# Patient Record
Sex: FEMALE | Race: BLACK OR AFRICAN AMERICAN | NOT HISPANIC OR LATINO | Employment: FULL TIME | URBAN - METROPOLITAN AREA
[De-identification: names, ages, dates, MRNs, and addresses within clinical notes are randomized per-mention and may not be internally consistent; named-entity substitution may affect disease eponyms.]

---

## 2019-08-21 ENCOUNTER — APPOINTMENT (OUTPATIENT)
Dept: RADIOLOGY | Facility: MEDICAL CENTER | Age: 19
End: 2019-08-21
Attending: EMERGENCY MEDICINE
Payer: MEDICAID

## 2019-08-21 ENCOUNTER — HOSPITAL ENCOUNTER (EMERGENCY)
Facility: MEDICAL CENTER | Age: 19
End: 2019-08-21
Attending: EMERGENCY MEDICINE
Payer: MEDICAID

## 2019-08-21 VITALS
HEIGHT: 71 IN | OXYGEN SATURATION: 100 % | SYSTOLIC BLOOD PRESSURE: 110 MMHG | TEMPERATURE: 99.2 F | HEART RATE: 76 BPM | RESPIRATION RATE: 17 BRPM | DIASTOLIC BLOOD PRESSURE: 63 MMHG | WEIGHT: 170.86 LBS | BODY MASS INDEX: 23.92 KG/M2

## 2019-08-21 DIAGNOSIS — R55 SYNCOPE, UNSPECIFIED SYNCOPE TYPE: ICD-10-CM

## 2019-08-21 DIAGNOSIS — D64.9 ANEMIA, UNSPECIFIED TYPE: ICD-10-CM

## 2019-08-21 DIAGNOSIS — Z3A.24 24 WEEKS GESTATION OF PREGNANCY: ICD-10-CM

## 2019-08-21 DIAGNOSIS — E87.6 HYPOKALEMIA: ICD-10-CM

## 2019-08-21 LAB
AMORPH CRY #/AREA URNS HPF: PRESENT /HPF
ANION GAP SERPL CALC-SCNC: 9 MMOL/L (ref 0–11.9)
APPEARANCE UR: ABNORMAL
BACTERIA #/AREA URNS HPF: NEGATIVE /HPF
BASOPHILS # BLD AUTO: 0.3 % (ref 0–1.8)
BASOPHILS # BLD: 0.02 K/UL (ref 0–0.12)
BILIRUB UR QL STRIP.AUTO: NEGATIVE
BUN SERPL-MCNC: <5 MG/DL (ref 8–22)
CALCIUM SERPL-MCNC: 8.9 MG/DL (ref 8.4–10.2)
CHLORIDE SERPL-SCNC: 101 MMOL/L (ref 96–112)
CO2 SERPL-SCNC: 23 MMOL/L (ref 20–33)
COLOR UR: YELLOW
CREAT SERPL-MCNC: 0.4 MG/DL (ref 0.5–1.4)
EKG IMPRESSION: NORMAL
EOSINOPHIL # BLD AUTO: 0.03 K/UL (ref 0–0.51)
EOSINOPHIL NFR BLD: 0.4 % (ref 0–6.9)
EPI CELLS #/AREA URNS HPF: NORMAL /HPF
ERYTHROCYTE [DISTWIDTH] IN BLOOD BY AUTOMATED COUNT: 39 FL (ref 35.9–50)
GLUCOSE SERPL-MCNC: 99 MG/DL (ref 65–99)
GLUCOSE UR STRIP.AUTO-MCNC: NEGATIVE MG/DL
HCT VFR BLD AUTO: 28.3 % (ref 37–47)
HGB BLD-MCNC: 9 G/DL (ref 12–16)
IMM GRANULOCYTES # BLD AUTO: 0.03 K/UL (ref 0–0.11)
IMM GRANULOCYTES NFR BLD AUTO: 0.4 % (ref 0–0.9)
KETONES UR STRIP.AUTO-MCNC: NEGATIVE MG/DL
LEUKOCYTE ESTERASE UR QL STRIP.AUTO: ABNORMAL
LYMPHOCYTES # BLD AUTO: 1.36 K/UL (ref 1–4.8)
LYMPHOCYTES NFR BLD: 17.5 % (ref 22–41)
MAGNESIUM SERPL-MCNC: 1.5 MG/DL (ref 1.5–2.5)
MCH RBC QN AUTO: 26.2 PG (ref 27–33)
MCHC RBC AUTO-ENTMCNC: 31.8 G/DL (ref 33.6–35)
MCV RBC AUTO: 82.3 FL (ref 81.4–97.8)
MICRO URNS: ABNORMAL
MONOCYTES # BLD AUTO: 0.5 K/UL (ref 0–0.85)
MONOCYTES NFR BLD AUTO: 6.4 % (ref 0–13.4)
NEUTROPHILS # BLD AUTO: 5.85 K/UL (ref 2–7.15)
NEUTROPHILS NFR BLD: 75 % (ref 44–72)
NITRITE UR QL STRIP.AUTO: NEGATIVE
NRBC # BLD AUTO: 0 K/UL
NRBC BLD-RTO: 0 /100 WBC
PH UR STRIP.AUTO: 7 [PH] (ref 5–8)
PHOSPHATE SERPL-MCNC: 2.6 MG/DL (ref 2.5–4.5)
PLATELET # BLD AUTO: 201 K/UL (ref 164–446)
PMV BLD AUTO: 9.6 FL (ref 9–12.9)
POTASSIUM SERPL-SCNC: 3.1 MMOL/L (ref 3.6–5.5)
PROT UR QL STRIP: NEGATIVE MG/DL
RBC # BLD AUTO: 3.44 M/UL (ref 4.2–5.4)
RBC # URNS HPF: NORMAL /HPF
RBC UR QL AUTO: ABNORMAL
SODIUM SERPL-SCNC: 133 MMOL/L (ref 135–145)
SP GR UR STRIP.AUTO: 1.02
UNIDENT CRYS URNS QL MICRO: NORMAL /HPF
WBC # BLD AUTO: 7.8 K/UL (ref 4.8–10.8)
WBC #/AREA URNS HPF: NORMAL /HPF

## 2019-08-21 PROCEDURE — 85025 COMPLETE CBC W/AUTO DIFF WBC: CPT

## 2019-08-21 PROCEDURE — 700102 HCHG RX REV CODE 250 W/ 637 OVERRIDE(OP): Performed by: EMERGENCY MEDICINE

## 2019-08-21 PROCEDURE — 83735 ASSAY OF MAGNESIUM: CPT

## 2019-08-21 PROCEDURE — 93005 ELECTROCARDIOGRAM TRACING: CPT | Performed by: EMERGENCY MEDICINE

## 2019-08-21 PROCEDURE — A9270 NON-COVERED ITEM OR SERVICE: HCPCS | Performed by: EMERGENCY MEDICINE

## 2019-08-21 PROCEDURE — 36415 COLL VENOUS BLD VENIPUNCTURE: CPT

## 2019-08-21 PROCEDURE — 80048 BASIC METABOLIC PNL TOTAL CA: CPT

## 2019-08-21 PROCEDURE — 76815 OB US LIMITED FETUS(S): CPT

## 2019-08-21 PROCEDURE — 99285 EMERGENCY DEPT VISIT HI MDM: CPT

## 2019-08-21 PROCEDURE — 84100 ASSAY OF PHOSPHORUS: CPT

## 2019-08-21 PROCEDURE — 81001 URINALYSIS AUTO W/SCOPE: CPT

## 2019-08-21 PROCEDURE — 700105 HCHG RX REV CODE 258: Performed by: EMERGENCY MEDICINE

## 2019-08-21 RX ORDER — POTASSIUM CHLORIDE 20 MEQ/1
20 TABLET, EXTENDED RELEASE ORAL 2 TIMES DAILY
Qty: 60 TAB | Refills: 11 | Status: SHIPPED | OUTPATIENT
Start: 2019-08-21 | End: 2019-08-26

## 2019-08-21 RX ORDER — SODIUM CHLORIDE 9 MG/ML
1000 INJECTION, SOLUTION INTRAVENOUS ONCE
Status: COMPLETED | OUTPATIENT
Start: 2019-08-21 | End: 2019-08-21

## 2019-08-21 RX ORDER — ACETAMINOPHEN 500 MG
500 TABLET ORAL EVERY 6 HOURS PRN
Status: ON HOLD | COMMUNITY
End: 2019-12-04

## 2019-08-21 RX ORDER — POTASSIUM CHLORIDE 20 MEQ/1
20 TABLET, EXTENDED RELEASE ORAL ONCE
Status: COMPLETED | OUTPATIENT
Start: 2019-08-21 | End: 2019-08-21

## 2019-08-21 RX ADMIN — SODIUM CHLORIDE 1000 ML: 9 INJECTION, SOLUTION INTRAVENOUS at 12:37

## 2019-08-21 RX ADMIN — POTASSIUM CHLORIDE 20 MEQ: 1500 TABLET, EXTENDED RELEASE ORAL at 12:38

## 2019-08-21 NOTE — ED NOTES
Med per order, vs as charted. Continues w/o c/o-pill given with crackers. Call light in reach, denies further needs

## 2019-08-21 NOTE — ED NOTES
Medication Reconciliation updated and complete per pt at bedside  Allergies have been verified   No oral ABX within the last 14 days  Pt [facility]

## 2019-08-21 NOTE — ED TRIAGE NOTES
"Chief Complaint   Patient presents with   • Near Syncopal     Pt reports feeling faint at work, ran to restroom and felt she \" went out\", did not fall off toilet. Also reports being \" in a daze in fromt of a customer\".    • Pregnancy     Pt is 24 weeks along. Has not followed with OB yet in this pregnancy.    • Vomiting     \" I vomit after each time I eat, thats normal for me.\"       "

## 2019-08-21 NOTE — DISCHARGE INSTRUCTIONS
Go to [facility] on [facility] for further issues.  There is a doctor who cares for pregnant patients there.    You need an ultrasound of your heart.    Follow-up with the pregnancy center.  Call today for an appointment.    Drink plenty of fluids.  Take prenatal vitamins.  Take potassium twice daily for 5 days and have your labs rechecked.

## 2019-08-21 NOTE — ED NOTES
Patient leaving against medical advice.   in to talk to patient about risks  Patient with good understanding

## 2019-08-21 NOTE — ED NOTES
Pt rounding upon return from u/s. In no apparent distress, vs as charted, await results. Aware of plan, call light in reach, friend at bedside

## 2019-08-21 NOTE — ED NOTES
Assumed care of pt-in u/s prior to being seen by this rn. Saline lock with blood draw by er tech prior to same

## 2019-08-21 NOTE — ED PROVIDER NOTES
"ED Provider Note    CHIEF COMPLAINT  Chief Complaint   Patient presents with   • Near Syncopal     Pt reports feeling faint at work, ran to restroom and felt she \" went out\", did not fall off toilet. Also reports being \" in a daze in fromt of a customer\".    • Pregnancy     Pt is 24 weeks along. Has not followed with OB yet in this pregnancy.    • Vomiting     \" I vomit after each time I eat, thats normal for me.\"       HPI  Amie Epstein is a 19 y.o.  female who is 24 weeks pregnant and presents complaining of syncope.    Patient states since Saturday she has had 2 syncopal events and 3 times daily presyncope symptoms with feeling dizzy.  Patient states when she has been standing for some time or stands up from a seated position, she experiences vision loss, feeling hot, and passes out.  Patient denies chest pain, palpitations, abdominal pain, vaginal bleeding, fever, chills, urinary symptoms other than frequency.    Patient reports having had prenatal care in [place] prior to moving here.  Patient does not have a current OB.  She has been taking prenatal vitamins.  She has a history of iron deficiency anemia.  She denies a history of sickle cell anemia or thalassemia.    Patient denies history of syncope prior to this pregnancy.    Patient reports vomiting 3 times daily with this pregnancy.  She has been taking promethazine without relief.      ALLERGIES  No Known Allergies    CURRENT MEDICATIONS  Prenatal vitamins  Promethazine    PAST MEDICAL HISTORY     Denies     SURGICAL HISTORY  patient denies any surgical history    SOCIAL HISTORY  Social History     Tobacco Use   • Smoking status: Never Smoker   • Smokeless tobacco: Never Used   Substance and Sexual Activity   • Alcohol use: Not Currently   • Drug use: Not Currently   • Sexual activity: Not on file       Family Hx:  No history of HOCM or preexcitation syndromes      REVIEW OF SYSTEMS  See HPI for further details.  All other systems are " "negative except as above in HPI.      PHYSICAL EXAM  VITAL SIGNS: BP (!) 96/58   Pulse 83   Temp 37.3 °C (99.2 °F) (Temporal)   Resp 17   Ht 1.803 m (5' 11\")   Wt 77.5 kg (170 lb 13.7 oz)   SpO2 98%   BMI 23.83 kg/m²     General:  WDWN, nontoxic appearing in NAD; A+Ox3; V/S as above; tachycardic at triage  Skin: warm and dry; good color; no rash  HEENT: NCAT; EOMs intact; PERRL; no scleral icterus   Neck: FROM; soft  Cardiovascular: Regular heart rate and rhythm.  No murmurs, rubs, or gallops; pulses 2+ bilaterally radially   Lungs: Clear to auscultation with good air movement bilaterally.  No wheezes, rhonchi, or rales.   Abdomen: BS present; soft; NT, gravid; no rebound, guarding, or rigidity.  No organomegaly or pulsatile mass  Extremities: ALBA x 4; no e/o trauma; no pedal edema; neg Brad's  Neurologic: CNs III-XII grossly intact; speech clear; distal sensation intact; strength 5/5 UE/LEs  Psychiatric: Appropriate affect, normal mood    LABS  Results for orders placed or performed during the hospital encounter of 08/21/19   CBC WITH DIFFERENTIAL   Result Value Ref Range    WBC 7.8 4.8 - 10.8 K/uL    RBC 3.44 (L) 4.20 - 5.40 M/uL    Hemoglobin 9.0 (L) 12.0 - 16.0 g/dL    Hematocrit 28.3 (L) 37.0 - 47.0 %    MCV 82.3 81.4 - 97.8 fL    MCH 26.2 (L) 27.0 - 33.0 pg    MCHC 31.8 (L) 33.6 - 35.0 g/dL    RDW 39.0 35.9 - 50.0 fL    Platelet Count 201 164 - 446 K/uL    MPV 9.6 9.0 - 12.9 fL    Neutrophils-Polys 75.00 (H) 44.00 - 72.00 %    Lymphocytes 17.50 (L) 22.00 - 41.00 %    Monocytes 6.40 0.00 - 13.40 %    Eosinophils 0.40 0.00 - 6.90 %    Basophils 0.30 0.00 - 1.80 %    Immature Granulocytes 0.40 0.00 - 0.90 %    Nucleated RBC 0.00 /100 WBC    Neutrophils (Absolute) 5.85 2.00 - 7.15 K/uL    Lymphs (Absolute) 1.36 1.00 - 4.80 K/uL    Monos (Absolute) 0.50 0.00 - 0.85 K/uL    Eos (Absolute) 0.03 0.00 - 0.51 K/uL    Baso (Absolute) 0.02 0.00 - 0.12 K/uL    Immature Granulocytes (abs) 0.03 0.00 - 0.11 K/uL    " NRBC (Absolute) 0.00 K/uL   URINALYSIS   Result Value Ref Range    Color Yellow     Character Cloudy (A)     Specific Gravity 1.020 <1.035    Ph 7.0 5.0 - 8.0    Glucose Negative Negative mg/dL    Ketones Negative Negative mg/dL    Protein Negative Negative mg/dL    Bilirubin Negative Negative    Nitrite Negative Negative    Leukocyte Esterase Trace (A) Negative    Occult Blood Trace (A) Negative    Micro Urine Req Microscopic    Basic Metabolic Panel   Result Value Ref Range    Sodium 133 (L) 135 - 145 mmol/L    Potassium 3.1 (L) 3.6 - 5.5 mmol/L    Chloride 101 96 - 112 mmol/L    Co2 23 20 - 33 mmol/L    Glucose 99 65 - 99 mg/dL    Bun <5 (L) 8 - 22 mg/dL    Creatinine 0.40 (L) 0.50 - 1.40 mg/dL    Calcium 8.9 8.4 - 10.2 mg/dL    Anion Gap 9.0 0.0 - 11.9   MAGNESIUM   Result Value Ref Range    Magnesium 1.5 1.5 - 2.5 mg/dL   PHOSPHORUS   Result Value Ref Range    Phosphorus 2.6 2.5 - 4.5 mg/dL   URINE MICROSCOPIC (W/UA)   Result Value Ref Range    WBC 0-2 /hpf    RBC 0-2 /hpf    Bacteria Negative None /hpf    Epithelial Cells Few Few /hpf    Urine Crystals Mod Amorphous /hpf    Amorphous Crystal Present /hpf   ESTIMATED GFR   Result Value Ref Range    GFR If African American >60 >60 mL/min/1.73 m 2    GFR If Non African American >60 >60 mL/min/1.73 m 2   EKG (NOW)   Result Value Ref Range    Report       [facility]    Test Date:  2019  Pt Name:    MAY MENON              Department: [facility]  MRN:        [id number]                      [facility]  Gender:     Female                       Technician: MONICA  :        2000                   Requested By:FARIHA RITTER  Order #:    [id number]                    Reading MD: FARIHA RITTER MD    Measurements  Intervals                                Axis  Rate:       73                           P:          61  WA:         167                          QRS:        63  QRSD:       89                            T:          30  QT:         373  QTc:        411    Interpretive Statements  Sinus rhythm  Baseline wander in lead(s) V1  No previous ECG available for comparison    Electronically Signed On 8- 12:31:53 PDT by FARIHA RITTER MD         IMAGING  US-OB LIMITED TRANSABDOMINAL   Final Result      Single intrauterine pregnancy of an estimated gestational age of Average 24w 2d with an estimated date of delivery of 12/9/2019.      Scratchy          MEDICAL RECORD  I have reviewed patient's medical record and pertinent results are listed below.      COURSE & MEDICAL DECISION MAKING  I have reviewed any medical record information, laboratory studies and radiographic results as noted.    Amie Epstein is a 19 y.o. female who presents complaining of syncope.  Given the occurrence when she goes from sitting to standing, I suspect orthostatic syncope.    Patient's labs demonstrate anemia with a hemoglobin of 9 and a hematocrit of 28.3.  MCV is normal.  Platelets normal.  CHEM panel demonstrates hyponatremia of 133 and hypokalemia of 3.1.  Urinalysis is negative for obvious UTI.  Urine culture submitted for trace leukocyte esterase.    EKG shows no arrhythmia, delta wave, QT prolongation, or PA shortening.    Orthostatics negative.  This makes me more concerned for a cardiac etiology.  I do not suspect PE given no hypoxia or tachycardia and no chest pain.    NS bolus was ordered for possible dehydration related to patient's sxs of vomiting and syncope.  KCl was also ordered.    I advised the patient that I would like to admit her up at [facility] for further work-up and rehydration as well as potassium replacement.  Patient would like to discuss with her significant other.    12:36 PM  Paging GYN    Dr. Rivera was on-call at [facility].  She called back but I was in a code stroke.    Upon reevaluation, the patient declines admission.  I explained that I would like to do a cardiac work-up  including echocardiogram and monitor her overnight for arrhythmia as well as replete her potassium and get her nausea under control so she does not become dehydrated.  Patient would like to sign out AMA.  She does not wish to be admitted and/or transferred.  She will go to [facility] where there is a GYN on call if she desires to be reassessed.  I have prescribed potassium for her.  She is aware of her anemia and that this may also be contributing to her symptoms.  She will need close follow-up with the [facility] pregnancy center, including an echocardiogram, in my opinion.  She is alert and oriented and appears competent to understand the risks involved and signing out AGAINST MEDICAL ADVICE including death of her and/or her baby.      FINAL IMPRESSION  1. Syncope, unspecified syncope type     2. Anemia, unspecified type     3. Hypokalemia     4. 24 weeks gestation of pregnancy         Electronically signed by: Mel Soriano, 8/21/2019 10:54 AM

## 2019-09-09 ENCOUNTER — INITIAL PRENATAL (OUTPATIENT)
Dept: OBGYN | Facility: CLINIC | Age: 19
End: 2019-09-09
Payer: MEDICAID

## 2019-09-09 VITALS
SYSTOLIC BLOOD PRESSURE: 100 MMHG | BODY MASS INDEX: 24.22 KG/M2 | HEIGHT: 71 IN | DIASTOLIC BLOOD PRESSURE: 60 MMHG | WEIGHT: 173 LBS

## 2019-09-09 DIAGNOSIS — Z34.03 SUPERVISION OF NORMAL FIRST PREGNANCY IN THIRD TRIMESTER: Primary | ICD-10-CM

## 2019-09-09 DIAGNOSIS — O99.013 ANEMIA IN PREGNANCY, THIRD TRIMESTER: ICD-10-CM

## 2019-09-09 LAB
APPEARANCE UR: CLEAR
BILIRUB UR STRIP-MCNC: NORMAL MG/DL
COLOR UR AUTO: YELLOW
GLUCOSE UR STRIP.AUTO-MCNC: NEGATIVE MG/DL
KETONES UR STRIP.AUTO-MCNC: NORMAL MG/DL
LEUKOCYTE ESTERASE UR QL STRIP.AUTO: NORMAL
NITRITE UR QL STRIP.AUTO: NEGATIVE
PH UR STRIP.AUTO: 7.5 [PH] (ref 5–8)
PROT UR QL STRIP: NORMAL MG/DL
RBC UR QL AUTO: NORMAL
SP GR UR STRIP.AUTO: 1.02
UROBILINOGEN UR STRIP-MCNC: NORMAL MG/DL

## 2019-09-09 PROCEDURE — 0500F INITIAL PRENATAL CARE VISIT: CPT | Performed by: NURSE PRACTITIONER

## 2019-09-09 PROCEDURE — 81002 URINALYSIS NONAUTO W/O SCOPE: CPT | Performed by: NURSE PRACTITIONER

## 2019-09-09 NOTE — PROGRESS NOTES
"Subjective:   Amie Epstein is a 19 y.o. AA  @ EGA: 27w0d BREE: Estimated Date of Delivery: 19  per US who presents for her new OB exam.  No c/o today.  Too late for AFP.  Declines CF.  Reports good FM.  Denies VB, LOF, or cramping.  Denies dysuria, vaginal DC.  Pregnancy is unplanned but wanted.  Transferring care from [place] - no records here as yet.     Initial Prenatal Visit          HPI  Review of Systems   All other systems reviewed and are negative.         Objective:     /60   Ht 1.803 m (5' 11\")   Wt 78.5 kg (173 lb)   LMP 2019   BMI 24.13 kg/m²          Wet mount: deferred        FHTs: 155        Fundal ht: 26     Physical Exam   Constitutional: She is oriented to person, place, and time. She appears well-developed and well-nourished.   HENT:   Head: Normocephalic and atraumatic.   Eyes:   Eye and ear exam deferred   Neck: Normal range of motion. Neck supple. No thyromegaly present.   Cardiovascular: Normal rate, regular rhythm, normal heart sounds and intact distal pulses.   Pulmonary/Chest: Effort normal and breath sounds normal.   Deferred   Abdominal: Soft. Normal appearance and bowel sounds are normal. There is no CVA tenderness.   Gravid   Genitourinary: No breast swelling, tenderness, discharge or bleeding.   Genitourinary Comments: Deferred   Musculoskeletal: Normal range of motion.   Neurological: She is alert and oriented to person, place, and time. She has normal reflexes.   Skin: Skin is warm and dry. Capillary refill takes less than 2 seconds.   Psychiatric: She has a normal mood and affect. Her behavior is normal. Judgment and thought content normal.   Nursing note and vitals reviewed.          A:   1.  IUP @ 27w0d BREE: Estimated Date of Delivery: 19 per US         2.  S=D        3.    Patient Active Problem List    Diagnosis Date Noted   • Supervision of normal first pregnancy in third trimester 2019     P:  1.  GC/CT deferred. Pap deferred. "         2.  28wk labs ordered - lab slip given        3.  Discussed PNV, diet, and adequate water intake        4.  NOB packet given        5.  Return to office in 2 wks        6.  Complete OB US already done.        7.  First trimester screening - too late.        8.  Instructions given on FKCs.         9.  Tdap declined.      10.  Records requested.

## 2019-09-09 NOTE — LETTER
Cystic Fibrosis Carrier Testing  Amei Epstein    The following information is about a blood test that can be done to determine if you and/or your partner carry the gene for cystic fibrosis.    WHAT IS CYSTIC FIBROSIS?  · Cystic fibrosis (CF) is an inherited disease that affects more than 25,000 American children and young adults.  · Symptoms of CF vary but include lung congestion, pneumonia, diarrhea and poor growth.  Most people with CF have severe medical problems and some die at a young age.  Others have so few symptoms they are unaware they have CF.  · CF does not affect intelligence.  · Although there is no cure for CF at this time, scientists are making progress in improving treatment and in searching for a cure.  In the past many people with CF  at a very young age.  Today, many are living into their 20’s and 30’s.    IS THERE A CHANCE MY BABY COULD HAVE CYSTIC FIBROSIS?  · You can have a child with CF even if there is no history in your family (see chart below).  · CF testing can help determine if you are a carrier and at risk to have a child with CF.  Note: if both parents are carriers, there is a 1 in 4 (25%) chance with each pregnancy that they will have a child with CF.  · Carriers have one normal CF gene and one altered CF gene.  · People with CF have two altered CF genes.  · Most people have two normal copies of the CF gene.    Approximate risk that a couple with no family history of cystic fibrosis will have a child with cystic fibrosis:    Ethnic background / Risk     couple:  1 in 2,500   couple:  1 in 15,000            couple:  1 in 8,000     American couple:  1 in 32,000     WHAT TESTING IS AVAILABLE?  · There is a blood test that can be done to find out if you or your partner is a carrier.  · It is important to understand that CF carrier testing does not detect all CF carriers.  · If the test shows that you are both CF carriers, you unborn baby  can be tested to find out if the baby has CF.    HOW MUCH DOES IT COST TO HAVE CYSTIC FIBROSIS CARRIER TESTING?  · Cost and insurance coverage for CF carrier testing vary depending upon the laboratory used and your insurance policy.  · The average cost for CF carrier testing is $300 per person.  · Your genetic counselor can provide you with more information about cystic fibrosis carrier testing.    _____  Yes, I am interested in discussing carrier testing with a genetic counselor.    _____  No, I am not interested in CF carrier testing or in receiving more information about CF carrier testing.      Client signature: ________________________________________  9/9/2019

## 2019-09-09 NOTE — PROGRESS NOTES
Pt. Here for NOB visit.  Good FM, kick count sheet given today along with instructions.   # [phone]  Pt is a transfer of care from [place].   Pt went to [facility] on 8/21/19 for syncope.   Pt. States no complaints.   Pharmacy verified.   Chaperone offered and provided.   Tdap unsure.

## 2019-09-09 NOTE — LETTER
"Count Your Baby's Movements  Another step to a healthy delivery    Martinmadeline MatthewsEpstein             Dept: [phone]    How Many Weeks Pregnant 27w0d    Date to Begin Countin19              How to use this chart    One way for your physician to keep track of your baby's health is by knowing how often the baby moves (or \"kicks\") in your womb.  You can help your physician to do this by using this chart every day.    Every day, you should see how many hours it takes for your baby to move 10 times.  Start in the morning, as soon as you get up.    · First, write down the time your baby moves until you get to 10.  · Check off one box every time your baby moves until you get to 10.  · Write down the time you finished counting in the last column.  · Total how long it took to count up all 10 movements.  · Finally, fill in the box that shows how long this took.  After counting 10 movements, you no longer have to count any more that day.  The next morning, just start counting again as soon as you get up.    What should you call a \"movement\"?  It is hard to say, because it will feel different from one mother to another and from one pregnancy to the next.  The important thing is that you count the movements the same way throughout your pregnancy.  If you have more questions, you should ask your physician.    Count carefully every day!  SAMPLE:  Week 28    How many hours did it take to feel 10 movements?       Start  Time     1     2     3     4     5     6     7     8     9     10   Finish Time   Mon 8:20 ·  ·  ·  ·  ·  ·  ·  ·  ·  ·  11:40                  Sat               Sun                 IMPORTANT: You should contact your physician if it takes more than two hours for you to feel 10 movements.  Each morning, write down the time and start to count the movements of your baby.  Keep track by checking off one box every time you feel one movement.  When you have felt " "10 \"kicks\", write down the time you finished counting in the last column.  Then fill in the   box (over the check katherine) for the number of hours it took.  Be sure to read the complete instructions on the previous page.            "

## 2019-09-09 NOTE — PATIENT INSTRUCTIONS
P:  1.  GC/CT deferred. Pap deferred.         2.  28wk labs ordered - lab slip given        3.  Discussed PNV, diet, and adequate water intake        4.  NOB packet given        5.  Return to office in 2 wks        6.  Complete OB US already done.        7.  First trimester screening - too late.        8.  Instructions given on FKCs.         9.  Tdap declined.      10.  Records requested.

## 2019-09-20 ENCOUNTER — HOSPITAL ENCOUNTER (OUTPATIENT)
Dept: LAB | Facility: MEDICAL CENTER | Age: 19
End: 2019-09-20
Attending: NURSE PRACTITIONER
Payer: MEDICAID

## 2019-09-20 ENCOUNTER — ROUTINE PRENATAL (OUTPATIENT)
Dept: OBGYN | Facility: CLINIC | Age: 19
End: 2019-09-20
Payer: MEDICAID

## 2019-09-20 VITALS — BODY MASS INDEX: 24.27 KG/M2 | WEIGHT: 174 LBS | SYSTOLIC BLOOD PRESSURE: 102 MMHG | DIASTOLIC BLOOD PRESSURE: 68 MMHG

## 2019-09-20 DIAGNOSIS — Z34.03 SUPERVISION OF NORMAL FIRST PREGNANCY IN THIRD TRIMESTER: ICD-10-CM

## 2019-09-20 DIAGNOSIS — Z34.03 ENCOUNTER FOR SUPERVISION OF NORMAL FIRST PREGNANCY IN THIRD TRIMESTER: ICD-10-CM

## 2019-09-20 PROCEDURE — 90040 PR PRENATAL FOLLOW UP: CPT | Performed by: NURSE PRACTITIONER

## 2019-09-20 NOTE — PROGRESS NOTES
OB follow up   + fetal movement.  No VB, LOF or UC's.  Wt: 174      BP:102/68  Phone # [phone]  Preferred pharmacy confirmed.  Kick count sheet given today.  Tdap offered  Flu vaccine offered  Labs? Not done yet, records not received.  Provider instructed 1HR Glucose, PNP given to patient with instructions.

## 2019-09-20 NOTE — PROGRESS NOTES
S:  Pt is  at 28w4d for routine OB follow up.  No concerns today. No prenatal or US on file. Reports good FM.  Denies VB, LOF, RUCs or vaginal DC.    O:  Please see above vitals.        FHTs: 145        Fundal ht: 27 cm.        S=D            A:  IUP at 28w4d  Patient Active Problem List    Diagnosis Date Noted   • Supervision of normal first pregnancy in third trimester 2019   • Anemia in pregnancy, third trimester 2019        P:  1.  Declines BTL.          2.  Instructions given on FKCs.          3.  Questions answered.          4.  PNL, 1 hr GTT, OB US ordered.          5.  Encourage adequate water intake.        6.   labor precautions reviewed.         7.  F/u 2 wks.        8.  TDap declined.        9.  Flu vaccine declined.

## 2019-09-20 NOTE — LETTER
"Count Your Baby's Movements  Another step to a healthy delivery    Martinmadeline MatthewsEpstein             Dept: [phone]    How Many Weeks Pregnant 28w4d  Date to Begin Countin19           How to use this chart    One way for your physician to keep track of your baby's health is by knowing how often the baby moves (or \"kicks\") in your womb.  You can help your physician to do this by using this chart every day.    Every day, you should see how many hours it takes for your baby to move 10 times.  Start in the morning, as soon as you get up.    · First, write down the time your baby moves until you get to 10.  · Check off one box every time your baby moves until you get to 10.  · Write down the time you finished counting in the last column.  · Total how long it took to count up all 10 movements.  · Finally, fill in the box that shows how long this took.  After counting 10 movements, you no longer have to count any more that day.  The next morning, just start counting again as soon as you get up.    What should you call a \"movement\"?  It is hard to say, because it will feel different from one mother to another and from one pregnancy to the next.  The important thing is that you count the movements the same way throughout your pregnancy.  If you have more questions, you should ask your physician.    Count carefully every day!  SAMPLE:  Week 28    How many hours did it take to feel 10 movements?       Start  Time     1     2     3     4     5     6     7     8     9     10   Finish Time   Mon 8:20 ·  ·  ·  ·  ·  ·  ·  ·  ·  ·  11:40                  [facility]                 IMPORTANT: You should contact your physician if it takes more than two hours for you to feel 10 movements.  Each morning, write down the time and start to count the movements of your baby.  Keep track by checking off one box every time you feel one movement.  When you have felt 10 " "\"kicks\", write down the time you finished counting in the last column.  Then fill in the   box (over the check katherine) for the number of hours it took.  Be sure to read the complete instructions on the previous page.            "

## 2019-09-27 PROBLEM — O09.899 RUBELLA NON-IMMUNE STATUS, ANTEPARTUM: Status: ACTIVE | Noted: 2019-09-27

## 2019-09-27 PROBLEM — Z28.39 RUBELLA NON-IMMUNE STATUS, ANTEPARTUM: Status: ACTIVE | Noted: 2019-09-27

## 2019-10-04 ENCOUNTER — APPOINTMENT (OUTPATIENT)
Dept: RADIOLOGY | Facility: IMAGING CENTER | Age: 19
End: 2019-10-04
Attending: NURSE PRACTITIONER
Payer: MEDICAID

## 2019-10-04 ENCOUNTER — ROUTINE PRENATAL (OUTPATIENT)
Dept: OBGYN | Facility: CLINIC | Age: 19
End: 2019-10-04
Payer: MEDICAID

## 2019-10-04 VITALS — WEIGHT: 180 LBS | DIASTOLIC BLOOD PRESSURE: 62 MMHG | BODY MASS INDEX: 25.1 KG/M2 | SYSTOLIC BLOOD PRESSURE: 118 MMHG

## 2019-10-04 DIAGNOSIS — Z34.03 SUPERVISION OF NORMAL FIRST PREGNANCY IN THIRD TRIMESTER: ICD-10-CM

## 2019-10-04 DIAGNOSIS — Z34.03 ENCOUNTER FOR SUPERVISION OF NORMAL FIRST PREGNANCY IN THIRD TRIMESTER: ICD-10-CM

## 2019-10-04 PROCEDURE — 76805 OB US >/= 14 WKS SNGL FETUS: CPT | Performed by: OBSTETRICS & GYNECOLOGY

## 2019-10-04 PROCEDURE — 90040 PR PRENATAL FOLLOW UP: CPT | Performed by: PHYSICIAN ASSISTANT

## 2019-10-04 NOTE — PROGRESS NOTES
Pt has no complaints with cramping, UCs, Vb, LOF. +FM. US done today - results pending. PNL, 1hr GTT wnl but pt has anemia and is rubella non-immune. Pt aware of anemia and has started taking PNV gummies and iron tabs daily. Pt encouraged to eat iron rich foods in diet - info sheet given today. Declines TDaP and flu vaccines, but pt aware she needs PP rubella vaccination. Also, I forgot to get GC/CT on pt today, so will do next visit. RTC 2 wk or sooner prn.

## 2019-10-04 NOTE — PROGRESS NOTES
Pt here today for OB follow up  Pt states complaints   Reports +FM  Good # [phone]  Pharmacy Confirmed.  Did Labs 9/20/19 at [facility].   Declined tdap and flu vaccines.

## 2019-10-08 ENCOUNTER — DATING (OUTPATIENT)
Dept: OBGYN | Facility: CLINIC | Age: 19
End: 2019-10-08

## 2019-10-08 DIAGNOSIS — Z34.03 SUPERVISION OF NORMAL FIRST PREGNANCY IN THIRD TRIMESTER: ICD-10-CM

## 2019-10-18 ENCOUNTER — TELEPHONE (OUTPATIENT)
Dept: OBGYN | Facility: CLINIC | Age: 19
End: 2019-10-18

## 2019-10-18 ENCOUNTER — ROUTINE PRENATAL (OUTPATIENT)
Dept: OBGYN | Facility: CLINIC | Age: 19
End: 2019-10-18
Payer: MEDICAID

## 2019-10-18 VITALS — DIASTOLIC BLOOD PRESSURE: 62 MMHG | WEIGHT: 180 LBS | SYSTOLIC BLOOD PRESSURE: 100 MMHG | BODY MASS INDEX: 25.1 KG/M2

## 2019-10-18 DIAGNOSIS — Z34.03 SUPERVISION OF NORMAL FIRST PREGNANCY IN THIRD TRIMESTER: Primary | ICD-10-CM

## 2019-10-18 PROCEDURE — 90040 PR PRENATAL FOLLOW UP: CPT | Performed by: NURSE PRACTITIONER

## 2019-10-18 NOTE — PROGRESS NOTES
S:  Pt is  at 32w4d for routine OB follow up.  Reports some BH UCs.  Reports good FM.  Denies VB, LOF, RUCs or vaginal DC.    O:  Please see above vitals.        FHTs: 160        Fundal ht: 32 cm.    Complete OB US  10/4/2019 8:07 AM    HISTORY/REASON FOR EXAM:  Evaluate fetal anatomy.    TECHNIQUE/EXAM DESCRIPTION: OB complete ultrasound.    COMPARISON:  Obstetrical ultrasound 2019.    FINDINGS:  Fetal Lie:  Vertex  LMP:  2019  Clinical BREE by LMP:  2019    Placenta (Location):  Posterior  Placenta Previa: No  Placental stGstrstastdstest:st st1st Amniotic Fluid Volume:  CARIDAD = 16.66 cm    Fetal Heart Rate:  166 bpm    Cervical Length:  4.10 cm transabdominal    No maternal adnexal mass is identified.    Umbilical Artery S/D Ratio(s):  Not applicable    Fetal Anatomy  (Seen or Not Seen)  Lateral Ventricles     Seen  Cisterna Magna        Seen  Cerebellum              Seen  CSP             Seen  Orbits             Seen  Face/Lips                Seen  Cord Insertion         Seen  Placental CI         Seen  4 Chamber Heart     Seen  LVOT               Seen  RVOT              Seen  3 Vessel View     Seen  Stomach       Seen  Kidneys                   Seen  Urinary Bladder      Seen  Spine                       Seen  3 Vessel Cord          Seen  Both Upper Extremities    Seen  Both Lower Extremities    Seen  Diaphragm             Seen  Movement       Seen  Gender:  Likely female    Fetal Biometry  BPD    7.32 cm 29 weeks 3 days  HC    28.12 cm 30 weeks 6 days  AC    25.01 cm 29 weeks 2 days  Femur Length    6.03 cm 31 weeks 3 days  Humerus Length    5.56 cm 32 weeks 2 days  Cerebellum Diameter   4.10 cm    EGA by this US:  31 weeks 0 days  BREE by this US: 2019  BREE by 1st US:  2019 by ER    Estimated Fetal Weight:  1509 grams  EFW Percentile: 23.5%    Comments:  Exam is somewhat limited by gestational age and fetal position.      Impression       1.  Single intrauterine pregnancy of an estimated  gestational age of 31 weeks 0 days with an estimated date of delivery of 12/06/2019.    2.  Aneurysmal foramen ovale flap.           A:  IUP at 32w4d  Patient Active Problem List    Diagnosis Date Noted   • Rubella non-immune status, antepartum 09/27/2019   • Supervision of normal first pregnancy in third trimester 09/09/2019   • Anemia in pregnancy, third trimester 09/09/2019        P:  1.  GBS @ 36 wks.          2.  Continue FKCs.          3.  Questions answered.          4.  Encouraged pt to tour L&D.          5.  Encourage adequate water intake.        6.  F/u 2 wks.        7.  US results reviewed w pt.        8.  GCCT done today.        9.  PTL precautions reviewed .    Chaperone offered and provided by Kati Gregory MA.

## 2019-10-18 NOTE — PATIENT INSTRUCTIONS
P:  1.  GBS @ 36 wks.          2.  Continue FKCs.          3.  Questions answered.          4.  Encouraged pt to tour L&D.          5.  Encourage adequate water intake.        6.  F/u 2 wks.        7.  US results reviewed w pt.        8.  GCCT done today.        9.  PTL precautions reviewed .

## 2019-10-18 NOTE — TELEPHONE ENCOUNTER
Pt called stating she had GC/CT test done this morning  and noticed she has some spotting, denies cramps. Adv pt normal to spot after test and Labor precautions given.  Pt understood and had no questions or concerns

## 2019-10-18 NOTE — PROGRESS NOTES
Pt here today for OB follow up  Pt states no complaints  Reports +SELVIN Michael # [phone]  Pharmacy Confirmed.  Chaperone offered and provided.   Pt to have MAICOL today

## 2019-10-22 DIAGNOSIS — Z34.03 SUPERVISION OF NORMAL FIRST PREGNANCY IN THIRD TRIMESTER: ICD-10-CM

## 2019-11-01 ENCOUNTER — ROUTINE PRENATAL (OUTPATIENT)
Dept: OBGYN | Facility: CLINIC | Age: 19
End: 2019-11-01
Payer: MEDICAID

## 2019-11-01 VITALS — BODY MASS INDEX: 24.83 KG/M2 | WEIGHT: 178 LBS | DIASTOLIC BLOOD PRESSURE: 60 MMHG | SYSTOLIC BLOOD PRESSURE: 100 MMHG

## 2019-11-01 DIAGNOSIS — Z34.03 SUPERVISION OF NORMAL FIRST PREGNANCY IN THIRD TRIMESTER: Primary | ICD-10-CM

## 2019-11-01 PROCEDURE — 90040 PR PRENATAL FOLLOW UP: CPT | Performed by: NURSE PRACTITIONER

## 2019-11-01 NOTE — PROGRESS NOTES
S) Pt is a 19 y.o.   at 34w4d  gestation. Routine prenatal care today. Pt having mild back pain, otherwise feeling well.    Fetal movement Normal  Cramping no  VB no  LOF no   Denies dysuria. Generally feels well today. Good self-care activities identified. Denies headaches, swelling, visual changes, or epigastric pain .     O) There were no vitals taken for this visit.        Labs:       PNL: anemic and rubella NI       GCT:87       AFP: Not Examined       GBS: N/A       Pertinent ultrasound - 10/4/19 CARIDAD 16.66, survey WNL, c/w prev dating           A) IUP at 34w4d       S=D         Patient Active Problem List    Diagnosis Date Noted   • Rubella non-immune status, antepartum 2019   • Supervision of normal first pregnancy in third trimester 2019   • Anemia in pregnancy, third trimester 2019          SVE: deferred         TDAP: no       FLU: no        BTL: no       : no       C/S Consent: no       IOL or C/S scheduled: no       LAST PAP: def d/t age         P) s/s ptl vs general discomforts. Fetal movements reviewed. General ed and anticipatory guidance. Nutrition/exercise/vitamin. Plans breast Plans pp contraception- unsure  Continue PNV.   Discussed tylenol, heat, ice, abd support band for back pain  Discussed GBS next visit  RTC 2 weeks or PRN.

## 2019-11-01 NOTE — PROGRESS NOTES
OB follow up   + fetal movement.  No VB, LOF or UC's.  Wt: 178      BP:100/60  Phone #  [phone]  Preferred pharmacy confirmed.  Pt states that her back has been hurting, and some pain in the lower abdomen.

## 2019-11-15 ENCOUNTER — ROUTINE PRENATAL (OUTPATIENT)
Dept: OBGYN | Facility: CLINIC | Age: 19
End: 2019-11-15
Payer: MEDICAID

## 2019-11-15 VITALS — DIASTOLIC BLOOD PRESSURE: 68 MMHG | SYSTOLIC BLOOD PRESSURE: 120 MMHG | WEIGHT: 182 LBS | BODY MASS INDEX: 25.38 KG/M2

## 2019-11-15 DIAGNOSIS — A74.9 CHLAMYDIA INFECTION AFFECTING PREGNANCY, ANTEPARTUM: ICD-10-CM

## 2019-11-15 DIAGNOSIS — Z3A.36 36 WEEKS GESTATION OF PREGNANCY: ICD-10-CM

## 2019-11-15 DIAGNOSIS — O98.819 CHLAMYDIA INFECTION AFFECTING PREGNANCY, ANTEPARTUM: ICD-10-CM

## 2019-11-15 PROCEDURE — 90040 PR PRENATAL FOLLOW UP: CPT | Performed by: NURSE PRACTITIONER

## 2019-11-15 ASSESSMENT — EDINBURGH POSTNATAL DEPRESSION SCALE (EPDS)
I HAVE BLAMED MYSELF UNNECESSARILY WHEN THINGS WENT WRONG: NO, NEVER
I HAVE FELT SAD OR MISERABLE: NO, NOT AT ALL
I HAVE FELT SCARED OR PANICKY FOR NO GOOD REASON: NO, NOT AT ALL
I HAVE LOOKED FORWARD WITH ENJOYMENT TO THINGS: AS MUCH AS I EVER DID
THE THOUGHT OF HARMING MYSELF HAS OCCURRED TO ME: NEVER
THINGS HAVE BEEN GETTING ON TOP OF ME: NO, MOST OF THE TIME I HAVE COPED QUITE WELL
I HAVE BEEN ANXIOUS OR WORRIED FOR NO GOOD REASON: YES, SOMETIMES
I HAVE BEEN SO UNHAPPY THAT I HAVE HAD DIFFICULTY SLEEPING: NOT AT ALL
TOTAL SCORE: 3
I HAVE BEEN ABLE TO LAUGH AND SEE THE FUNNY SIDE OF THINGS: AS MUCH AS I ALWAYS COULD
I HAVE BEEN SO UNHAPPY THAT I HAVE BEEN CRYING: NO, NEVER

## 2019-11-15 NOTE — PROGRESS NOTES
Pt. here for Ob f/u and GBS today. Good # [phone]  Good FM  Pt states no complaints.   Pharmacy verified.   Chaperone offered and not needed.

## 2019-11-15 NOTE — PROGRESS NOTES
SUBJECTIVE:  Pt is a 19 y.o.   at 36w4d  gestation. Presents today for follow-up prenatal care. Reports no issues at this time.  Reports good  fetal movement. Denies cramping/contractions, bleeding or leaking of fluid. Denies dysuria, headaches, N/V. Generally feels well today expect leg siezes up. Reports does not think she can breastfeed because her breasts are small. Taking iron once a day.     OBJECTIVE:  - See prenatal vitals flow  -   Vitals:    11/15/19 1011   BP: 120/68   Weight: 82.6 kg (182 lb)                 ASSESSMENT:   - IUP at 36w4d    - S=D  Patient Active Problem List    Diagnosis Date Noted   • Rubella non-immune status, antepartum 2019   • Supervision of normal first pregnancy in third trimester 2019   • Anemia in pregnancy, third trimester 2019         PLAN:  - S/sx pregnancy and labor warning signs vs general discomforts discussed  - Fetal movements and/or kick counts reviewed   - Adequate hydration reinforced  - Nutrition/exercise/vitamin education; continue PNV  - Plans for formulafeeding; reviewed benefits of BF  - Plans unsure for contraception Pp: handout given and reviewed  - Reviewed active labor s/sx and pt had questions about when she can get epidural   - GBS collected along with EPDS screening   - Anticipatory guidance given  - RTC in 1 weeks for follow-up prenatal care

## 2019-11-19 DIAGNOSIS — Z3A.36 36 WEEKS GESTATION OF PREGNANCY: ICD-10-CM

## 2019-11-19 PROBLEM — B95.1 GROUP BETA STREP POSITIVE: Status: ACTIVE | Noted: 2019-11-19

## 2019-11-22 ENCOUNTER — ROUTINE PRENATAL (OUTPATIENT)
Dept: OBGYN | Facility: CLINIC | Age: 19
End: 2019-11-22
Payer: MEDICAID

## 2019-11-22 VITALS — SYSTOLIC BLOOD PRESSURE: 108 MMHG | BODY MASS INDEX: 25.52 KG/M2 | DIASTOLIC BLOOD PRESSURE: 58 MMHG | WEIGHT: 183 LBS

## 2019-11-22 PROCEDURE — 90040 PR PRENATAL FOLLOW UP: CPT | Performed by: NURSE PRACTITIONER

## 2019-11-22 NOTE — PROGRESS NOTES
Pt here today for OB follow up  Pt states no complaints  Reports +FM  Good # [phone]  Pharmacy Confirmed.  Chaperone offered and provided.   GBS +

## 2019-11-22 NOTE — PROGRESS NOTES
SUBJECTIVE:  Pt is a 19 y.o.   at 37w4d  gestation. Presents today for follow-up prenatal care. Reports no issues at this time.  Reports feeling good  fetal movement. Denies cramping/contractions, bleeding or leaking of fluid. Denies dysuria, headaches, N/V. Generally feels well today. Recent ER or L&D visits - none.     OBJECTIVE:  - See prenatal vitals flow  -   Vitals:    19 0905   BP: 108/58   Weight: 83 kg (183 lb)      Fundal Height - 36  FHT - 135  US normal  Prenatal labs GBS positive              ASSESSMENT:   - IUP at 37w4d    - S=D   -   Patient Active Problem List    Diagnosis Date Noted   • Foramen ovale anyeurism fetus 2019   • Group beta Strep positive 2019   • Rubella non-immune status, antepartum 2019   • Supervision of normal first pregnancy in third trimester 2019   • Anemia in pregnancy, third trimester 2019         PLAN:  - S/sx pregnancy and labor warning signs vs general discomforts discussed  - Fetal movements and kick counts reviewed   - Adequate hydration reinforced  - Nutrition/exercise/vitamin education; continued PNV  - Encouraged tour of LnD/childbirth education classes  - We have discussed contraception options.

## 2019-11-27 ENCOUNTER — ROUTINE PRENATAL (OUTPATIENT)
Dept: OBGYN | Facility: CLINIC | Age: 19
End: 2019-11-27
Payer: MEDICAID

## 2019-11-27 VITALS — DIASTOLIC BLOOD PRESSURE: 68 MMHG | WEIGHT: 182 LBS | BODY MASS INDEX: 25.38 KG/M2 | SYSTOLIC BLOOD PRESSURE: 126 MMHG

## 2019-11-27 DIAGNOSIS — Z34.03 SUPERVISION OF NORMAL FIRST PREGNANCY IN THIRD TRIMESTER: Primary | ICD-10-CM

## 2019-11-27 PROCEDURE — 90040 PR PRENATAL FOLLOW UP: CPT | Performed by: NURSE PRACTITIONER

## 2019-11-27 NOTE — PROGRESS NOTES
S) Pt is a 19 y.o.   at 38w2d  gestation. Routine prenatal care today. No complaints today outside of normal pregnancy discomforts. SVE and IOL today. Discussed GBS positive results and what that means for her and baby. Labor precautions reviewed, all questions answered.    Fetal movement Normal  Cramping no  VB no  LOF no   Denies dysuria. Generally feels well today. Good self-care activities identified. Denies headaches, swelling, visual changes, or epigastric pain .     O) /68   Wt 82.6 kg (182 lb)         Labs:       PNL: WNL, Rubella non-immune       GCT: 87        AFP: Not Examined       GBS: positive       Pertinent ultrasound -        10/4/19- Survey with AFO noted, remainder WNL, CARIDAD 16.66cm, c/w prev dating.    A) IUP at 38w2d       S=D         Patient Active Problem List    Diagnosis Date Noted   • Foramen ovale anyeurism fetus 2019   • Group beta Strep positive 2019   • Rubella non-immune status, antepartum 2019   • Supervision of normal first pregnancy in third trimester 2019   • Anemia in pregnancy, third trimester 2019          SVE: closed/30/-2       Chaperone offered: yes         TDAP: no       FLU: no        BTL: no       : n/a       C/S Consent: n/a       IOL or C/S scheduled: no- referral placed today       LAST PAP: deferred due to age         P) s/s ptl vs general discomforts. Fetal movements reviewed. General ed and anticipatory guidance. Nutrition/exercise/vitamin. Plans breast Plans pp contraception- unsure  Continue PNV.

## 2019-11-27 NOTE — PROGRESS NOTES
Pt here today for OB follow up  Pt states no complaints   Reports +  Good # [phone]  Pharmacy Confirmed.  Chaperone offered and declined.

## 2019-12-04 ENCOUNTER — ANESTHESIA (OUTPATIENT)
Dept: ANESTHESIOLOGY | Facility: MEDICAL CENTER | Age: 19
End: 2019-12-04
Payer: MEDICAID

## 2019-12-04 ENCOUNTER — HOSPITAL ENCOUNTER (INPATIENT)
Facility: MEDICAL CENTER | Age: 19
LOS: 2 days | End: 2019-12-06
Attending: OBSTETRICS & GYNECOLOGY | Admitting: OBSTETRICS & GYNECOLOGY
Payer: MEDICAID

## 2019-12-04 ENCOUNTER — ANESTHESIA EVENT (OUTPATIENT)
Dept: ANESTHESIOLOGY | Facility: MEDICAL CENTER | Age: 19
End: 2019-12-04
Payer: MEDICAID

## 2019-12-04 LAB
BASOPHILS # BLD AUTO: 0.1 % (ref 0–1.8)
BASOPHILS # BLD: 0.01 K/UL (ref 0–0.12)
CRYSTALS AMN MICRO: NORMAL
EOSINOPHIL # BLD AUTO: 0.02 K/UL (ref 0–0.51)
EOSINOPHIL NFR BLD: 0.2 % (ref 0–6.9)
ERYTHROCYTE [DISTWIDTH] IN BLOOD BY AUTOMATED COUNT: 45.1 FL (ref 35.9–50)
ERYTHROCYTE [DISTWIDTH] IN BLOOD BY AUTOMATED COUNT: 46.5 FL (ref 35.9–50)
HCT VFR BLD AUTO: 34.1 % (ref 37–47)
HCT VFR BLD AUTO: 36.1 % (ref 37–47)
HGB BLD-MCNC: 10.7 G/DL (ref 12–16)
HGB BLD-MCNC: 11.6 G/DL (ref 12–16)
HOLDING TUBE BB 8507: NORMAL
IMM GRANULOCYTES # BLD AUTO: 0.03 K/UL (ref 0–0.11)
IMM GRANULOCYTES NFR BLD AUTO: 0.3 % (ref 0–0.9)
LYMPHOCYTES # BLD AUTO: 1.82 K/UL (ref 1–4.8)
LYMPHOCYTES NFR BLD: 18.9 % (ref 22–41)
MCH RBC QN AUTO: 26 PG (ref 27–33)
MCH RBC QN AUTO: 26.3 PG (ref 27–33)
MCHC RBC AUTO-ENTMCNC: 31.4 G/DL (ref 33.6–35)
MCHC RBC AUTO-ENTMCNC: 32.1 G/DL (ref 33.6–35)
MCV RBC AUTO: 81.9 FL (ref 81.4–97.8)
MCV RBC AUTO: 83 FL (ref 81.4–97.8)
MONOCYTES # BLD AUTO: 0.79 K/UL (ref 0–0.85)
MONOCYTES NFR BLD AUTO: 8.2 % (ref 0–13.4)
NEUTROPHILS # BLD AUTO: 6.98 K/UL (ref 2–7.15)
NEUTROPHILS NFR BLD: 72.3 % (ref 44–72)
NRBC # BLD AUTO: 0 K/UL
NRBC BLD-RTO: 0 /100 WBC
PLATELET # BLD AUTO: 219 K/UL (ref 164–446)
PLATELET # BLD AUTO: 236 K/UL (ref 164–446)
PMV BLD AUTO: 10.1 FL (ref 9–12.9)
PMV BLD AUTO: 10.5 FL (ref 9–12.9)
RBC # BLD AUTO: 4.11 M/UL (ref 4.2–5.4)
RBC # BLD AUTO: 4.41 M/UL (ref 4.2–5.4)
WBC # BLD AUTO: 13.5 K/UL (ref 4.8–10.8)
WBC # BLD AUTO: 9.7 K/UL (ref 4.8–10.8)

## 2019-12-04 PROCEDURE — 89060 EXAM SYNOVIAL FLUID CRYSTALS: CPT

## 2019-12-04 PROCEDURE — 10907ZC DRAINAGE OF AMNIOTIC FLUID, THERAPEUTIC FROM PRODUCTS OF CONCEPTION, VIA NATURAL OR ARTIFICIAL OPENING: ICD-10-PCS | Performed by: OBSTETRICS & GYNECOLOGY

## 2019-12-04 PROCEDURE — 85025 COMPLETE CBC W/AUTO DIFF WBC: CPT

## 2019-12-04 PROCEDURE — 700111 HCHG RX REV CODE 636 W/ 250 OVERRIDE (IP)

## 2019-12-04 PROCEDURE — 700111 HCHG RX REV CODE 636 W/ 250 OVERRIDE (IP): Performed by: PHYSICIAN ASSISTANT

## 2019-12-04 PROCEDURE — 85027 COMPLETE CBC AUTOMATED: CPT

## 2019-12-04 PROCEDURE — 59400 OBSTETRICAL CARE: CPT | Performed by: PHYSICIAN ASSISTANT

## 2019-12-04 PROCEDURE — 770002 HCHG ROOM/CARE - OB PRIVATE (112)

## 2019-12-04 PROCEDURE — 36415 COLL VENOUS BLD VENIPUNCTURE: CPT

## 2019-12-04 PROCEDURE — 303615 HCHG EPIDURAL/SPINAL ANESTHESIA FOR LABOR

## 2019-12-04 PROCEDURE — 700111 HCHG RX REV CODE 636 W/ 250 OVERRIDE (IP): Performed by: ANESTHESIOLOGY

## 2019-12-04 PROCEDURE — 304965 HCHG RECOVERY SERVICES

## 2019-12-04 PROCEDURE — 700105 HCHG RX REV CODE 258: Performed by: PHYSICIAN ASSISTANT

## 2019-12-04 PROCEDURE — 59409 OBSTETRICAL CARE: CPT

## 2019-12-04 RX ORDER — MISOPROSTOL 200 UG/1
600 TABLET ORAL
Status: DISCONTINUED | OUTPATIENT
Start: 2019-12-04 | End: 2019-12-06 | Stop reason: HOSPADM

## 2019-12-04 RX ORDER — ACETAMINOPHEN 325 MG/1
325 TABLET ORAL EVERY 4 HOURS PRN
Status: DISCONTINUED | OUTPATIENT
Start: 2019-12-04 | End: 2019-12-06 | Stop reason: HOSPADM

## 2019-12-04 RX ORDER — METHYLERGONOVINE MALEATE 0.2 MG/ML
0.2 INJECTION INTRAVENOUS
Status: DISCONTINUED | OUTPATIENT
Start: 2019-12-04 | End: 2019-12-04 | Stop reason: HOSPADM

## 2019-12-04 RX ORDER — SODIUM CHLORIDE, SODIUM LACTATE, POTASSIUM CHLORIDE, AND CALCIUM CHLORIDE .6; .31; .03; .02 G/100ML; G/100ML; G/100ML; G/100ML
250 INJECTION, SOLUTION INTRAVENOUS PRN
Status: DISCONTINUED | OUTPATIENT
Start: 2019-12-04 | End: 2019-12-04 | Stop reason: HOSPADM

## 2019-12-04 RX ORDER — BUPIVACAINE HYDROCHLORIDE 2.5 MG/ML
INJECTION, SOLUTION EPIDURAL; INFILTRATION; INTRACAUDAL
Status: COMPLETED | OUTPATIENT
Start: 2019-12-04 | End: 2019-12-04

## 2019-12-04 RX ORDER — SODIUM CHLORIDE, SODIUM LACTATE, POTASSIUM CHLORIDE, CALCIUM CHLORIDE 600; 310; 30; 20 MG/100ML; MG/100ML; MG/100ML; MG/100ML
INJECTION, SOLUTION INTRAVENOUS CONTINUOUS
Status: DISPENSED | OUTPATIENT
Start: 2019-12-04 | End: 2019-12-04

## 2019-12-04 RX ORDER — VITAMIN A ACETATE, BETA CAROTENE, ASCORBIC ACID, CHOLECALCIFEROL, .ALPHA.-TOCOPHEROL ACETATE, DL-, THIAMINE MONONITRATE, RIBOFLAVIN, NIACINAMIDE, PYRIDOXINE HYDROCHLORIDE, FOLIC ACID, CYANOCOBALAMIN, CALCIUM CARBONATE, FERROUS FUMARATE, ZINC OXIDE, CUPRIC OXIDE 3080; 12; 120; 400; 1; 1.84; 3; 20; 22; 920; 25; 200; 27; 10; 2 [IU]/1; UG/1; MG/1; [IU]/1; MG/1; MG/1; MG/1; MG/1; MG/1; [IU]/1; MG/1; MG/1; MG/1; MG/1; MG/1
1 TABLET, FILM COATED ORAL EVERY MORNING
Status: DISCONTINUED | OUTPATIENT
Start: 2019-12-05 | End: 2019-12-06 | Stop reason: HOSPADM

## 2019-12-04 RX ORDER — SODIUM CHLORIDE, SODIUM LACTATE, POTASSIUM CHLORIDE, CALCIUM CHLORIDE 600; 310; 30; 20 MG/100ML; MG/100ML; MG/100ML; MG/100ML
INJECTION, SOLUTION INTRAVENOUS PRN
Status: DISCONTINUED | OUTPATIENT
Start: 2019-12-04 | End: 2019-12-06 | Stop reason: HOSPADM

## 2019-12-04 RX ORDER — SODIUM CHLORIDE, SODIUM LACTATE, POTASSIUM CHLORIDE, AND CALCIUM CHLORIDE .6; .31; .03; .02 G/100ML; G/100ML; G/100ML; G/100ML
1000 INJECTION, SOLUTION INTRAVENOUS
Status: DISCONTINUED | OUTPATIENT
Start: 2019-12-04 | End: 2019-12-04 | Stop reason: HOSPADM

## 2019-12-04 RX ORDER — ROPIVACAINE HYDROCHLORIDE 2 MG/ML
INJECTION, SOLUTION EPIDURAL; INFILTRATION; PERINEURAL
Status: COMPLETED
Start: 2019-12-04 | End: 2019-12-04

## 2019-12-04 RX ORDER — MISOPROSTOL 200 UG/1
800 TABLET ORAL
Status: DISCONTINUED | OUTPATIENT
Start: 2019-12-04 | End: 2019-12-04 | Stop reason: HOSPADM

## 2019-12-04 RX ORDER — IBUPROFEN 600 MG/1
600 TABLET ORAL EVERY 6 HOURS PRN
Status: DISCONTINUED | OUTPATIENT
Start: 2019-12-04 | End: 2019-12-06 | Stop reason: HOSPADM

## 2019-12-04 RX ORDER — DOCUSATE SODIUM 100 MG/1
100 CAPSULE, LIQUID FILLED ORAL 2 TIMES DAILY PRN
Status: DISCONTINUED | OUTPATIENT
Start: 2019-12-04 | End: 2019-12-06 | Stop reason: HOSPADM

## 2019-12-04 RX ORDER — OXYCODONE HYDROCHLORIDE AND ACETAMINOPHEN 5; 325 MG/1; MG/1
1 TABLET ORAL EVERY 4 HOURS PRN
Status: DISCONTINUED | OUTPATIENT
Start: 2019-12-04 | End: 2019-12-06 | Stop reason: HOSPADM

## 2019-12-04 RX ORDER — METHYLERGONOVINE MALEATE 0.2 MG/ML
0.2 INJECTION INTRAVENOUS
Status: DISCONTINUED | OUTPATIENT
Start: 2019-12-04 | End: 2019-12-06 | Stop reason: HOSPADM

## 2019-12-04 RX ORDER — ROPIVACAINE HYDROCHLORIDE 2 MG/ML
INJECTION, SOLUTION EPIDURAL; INFILTRATION; PERINEURAL CONTINUOUS
Status: DISCONTINUED | OUTPATIENT
Start: 2019-12-04 | End: 2019-12-06 | Stop reason: HOSPADM

## 2019-12-04 RX ADMIN — OXYTOCIN 2000 ML/HR: 10 INJECTION, SOLUTION INTRAMUSCULAR; INTRAVENOUS at 14:55

## 2019-12-04 RX ADMIN — SODIUM CHLORIDE, POTASSIUM CHLORIDE, SODIUM LACTATE AND CALCIUM CHLORIDE: 600; 310; 30; 20 INJECTION, SOLUTION INTRAVENOUS at 11:51

## 2019-12-04 RX ADMIN — SODIUM CHLORIDE, POTASSIUM CHLORIDE, SODIUM LACTATE AND CALCIUM CHLORIDE: 600; 310; 30; 20 INJECTION, SOLUTION INTRAVENOUS at 10:05

## 2019-12-04 RX ADMIN — ROPIVACAINE HYDROCHLORIDE 200 MG: 2 INJECTION, SOLUTION EPIDURAL; INFILTRATION; PERINEURAL at 10:42

## 2019-12-04 RX ADMIN — OXYTOCIN 125 ML/HR: 10 INJECTION, SOLUTION INTRAMUSCULAR; INTRAVENOUS at 16:21

## 2019-12-04 RX ADMIN — ROPIVACAINE HYDROCHLORIDE 200 MG: 2 INJECTION, SOLUTION EPIDURAL; INFILTRATION at 10:42

## 2019-12-04 RX ADMIN — SODIUM CHLORIDE 2.5 MILLION UNITS: 9 INJECTION, SOLUTION INTRAVENOUS at 14:13

## 2019-12-04 RX ADMIN — BUPIVACAINE HYDROCHLORIDE 10 ML: 2.5 INJECTION, SOLUTION EPIDURAL; INFILTRATION; INTRACAUDAL; PERINEURAL at 10:31

## 2019-12-04 RX ADMIN — SODIUM CHLORIDE, POTASSIUM CHLORIDE, SODIUM LACTATE AND CALCIUM CHLORIDE: 600; 310; 30; 20 INJECTION, SOLUTION INTRAVENOUS at 10:42

## 2019-12-04 RX ADMIN — SODIUM CHLORIDE 5 MILLION UNITS: 900 INJECTION INTRAVENOUS at 10:07

## 2019-12-04 ASSESSMENT — PATIENT HEALTH QUESTIONNAIRE - PHQ9
2. FEELING DOWN, DEPRESSED, IRRITABLE, OR HOPELESS: NOT AT ALL
1. LITTLE INTEREST OR PLEASURE IN DOING THINGS: NOT AT ALL
2. FEELING DOWN, DEPRESSED, IRRITABLE, OR HOPELESS: NOT AT ALL
1. LITTLE INTEREST OR PLEASURE IN DOING THINGS: NOT AT ALL
SUM OF ALL RESPONSES TO PHQ9 QUESTIONS 1 AND 2: 0
2. FEELING DOWN, DEPRESSED, IRRITABLE, OR HOPELESS: NOT AT ALL
2. FEELING DOWN, DEPRESSED, IRRITABLE, OR HOPELESS: NOT AT ALL
1. LITTLE INTEREST OR PLEASURE IN DOING THINGS: NOT AT ALL
SUM OF ALL RESPONSES TO PHQ9 QUESTIONS 1 AND 2: 0
1. LITTLE INTEREST OR PLEASURE IN DOING THINGS: NOT AT ALL

## 2019-12-04 ASSESSMENT — COPD QUESTIONNAIRES
DO YOU EVER COUGH UP ANY MUCUS OR PHLEGM?: NO/ONLY WITH OCCASIONAL COLDS OR INFECTIONS
COPD SCREENING SCORE: 0
HAVE YOU SMOKED AT LEAST 100 CIGARETTES IN YOUR ENTIRE LIFE: NO/DON'T KNOW
DURING THE PAST 4 WEEKS HOW MUCH DID YOU FEEL SHORT OF BREATH: NONE/LITTLE OF THE TIME
IN THE PAST 12 MONTHS DO YOU DO LESS THAN YOU USED TO BECAUSE OF YOUR BREATHING PROBLEMS: DISAGREE/UNSURE

## 2019-12-04 ASSESSMENT — LIFESTYLE VARIABLES
ALCOHOL_USE: NO
EVER_SMOKED: NEVER

## 2019-12-04 NOTE — ANESTHESIA PROCEDURE NOTES
Epidural Block  Date/Time: 12/4/2019 10:31 AM  Performed by: Pepe Gunn M.D.  Authorized by: Pepe Gunn M.D.     Patient Location:  OB  Start Time:  12/4/2019 10:31 AM  Reason for Block: labor analgesia    patient identified, IV checked, site marked, risks and benefits discussed, surgical consent, monitors and equipment checked, pre-op evaluation and timeout performed    Patient Position:  Sitting  Prep: ChloraPrep, patient draped and sterile technique    Monitoring:  Blood pressure, continuous pulse oximetry and heart rate  Approach:  Midline  Location:  L3-L4  Injection Technique:  EVER saline  Skin infiltration:  Lidocaine  Strength:  1%  Dose:  3ml  Needle Type:  Tuohy  Needle Gauge:  17 G  Needle Length:  3.5 in  Loss of resistance::  3  Catheter Size:  19 G  Catheter at Skin Depth:  3  Test Dose:  Lidocaine 1.5% with epinephrine 1-to-200,000  Test Dose Result:  Negative

## 2019-12-04 NOTE — H&P
History and Physical    Amie Epstein is a 19 y.o. female  -Para:     Gestational Age:  39w2d  Admitted for:   Active Labor  Admitted to  [facility].  [facility]    HPI: Patient is admitted with the above mentioned Chief Complaint and States   Loss of fluid:   negative  Abdominal Pain:  negative  Uterine Contractions:  positive  Vaginal Bleeding:  negative  Fetal Movement:  normal  Patient denies fever, chills, nausea, vomiting , headache, visual disturbance, or dysuria  Patient's last menstrual period was 2019.  Estimated Date of Delivery: 19  Final BREE: 2019, by Last Menstrual Period    Patient Active Problem List    Diagnosis Date Noted   • Foramen ovale anyeurism fetus 2019   • Group beta Strep positive 2019   • Rubella non-immune status, antepartum 2019   • Supervision of normal first pregnancy in third trimester 2019   • Anemia in pregnancy, third trimester 2019       Admitting DX: Delivery   Pregnancy Complications:  group B strep (untreated)  OB Risk Factors:   anemia  Labor State:    Active phase labor.    History:   has a past medical history of Anemia in pregnancy, third trimester (2019). She also has no past medical history of Addisons disease (formerly [facility]), Adrenal disorder (formerly [facility]), Allergy, Anxiety, Arrhythmia, Arthritis, Asthma, Blood transfusion without reported diagnosis, Cancer (formerly [facility]), Cataract, CHF (congestive heart failure) (formerly [facility]), Clotting disorder (formerly [facility]), COPD (chronic obstructive pulmonary disease) (formerly [facility]), Cushings syndrome (formerly [facility]), Depression, Diabetes (formerly [facility]), Diabetic neuropathy (formerly [facility]), GERD (gastroesophageal reflux disease), Glaucoma, Goiter, Head ache, Heart attack (formerly [facility]), Heart murmur, HIV (human immunodeficiency virus infection) (formerly [facility]), Hyperlipidemia, Hypertension, IBD (inflammatory bowel disease), Kidney disease, Meningitis, Migraine, Muscle disorder, Osteoporosis, Parathyroid  "disorder (HCC), Pituitary disease (HCC), Pulmonary emphysema (HCC), Seizure (HCC), Stroke (HCC), Substance abuse (HCC), Thyroid disease, Tuberculosis, or Urinary tract infection.     has no past surgical history on file.    OB History    Para Term  AB Living   1             SAB TAB Ectopic Molar Multiple Live Births                    # Outcome Date GA Lbr Jimmy/2nd Weight Sex Delivery Anes PTL Lv   1 Current                Medications:  No current facility-administered medications on file prior to encounter.      Current Outpatient Medications on File Prior to Encounter   Medication Sig Dispense Refill   • IRON PO Take  by mouth.     • acetaminophen (TYLENOL) 500 MG Tab Take 500 mg by mouth every 6 hours as needed for Moderate Pain.     • Prenatal Vit-Fe Fumarate-FA (PRENATAL PO) Take 1 Tab by mouth 2 Times a Day.         Allergies:  Patient has no known allergies.    ROS:   Neuro: negative    Cardiovascular: negative  Gastro intestinal: negative  Genitourinary: negative            Physical Exam:  /57   Pulse 88   Temp 36.5 °C (97.7 °F) (Temporal)   Resp 18   Ht 1.803 m (5' 11\")   Wt 83 kg (183 lb)   LMP 2019   BMI 25.52 kg/m²   Constitutional: healthy-appearing, Well-developed, well-nourished  No JVD: while supine  HEENT: PERRLA  Breast Exam: negative  Cardio: regular rate and rhythm  Lung: unlabored respirations, no intercostal retractions or accessory muscle use  Abdomen: abdomen is soft without significant tenderness, masses, organomegaly or guarding  Extremity: extremities, peripheral pulses and reflexes normal    Cervical Exam: 90%  Cervix Dilatation: 4-5  Station: negative 2  Pelvis: Normal  Fetal Assessment: Fetal heart variability: moderate  Fetal Heart Rate decelerations: none  Fetal Heart Rate accelerations: yes  Baseline FHR: 140 per minute  Uterine contractions: regular, every 2-4 minutes  Estimated Fetal Weight: 3000 - 3500g      Labs:      Prenatal Results     General " (Most Recent Result)     Test Value Date Time    ABO       Rh       Antibody screen       HbA1c       Gonorrhea       Chlamydia  by PCR       Chlamydia by DNA       RPR/Syphilus       HSV 1/2 by PCR (non-serum)       HSV 1/2 (serum)       HSV 1       HSV 2       HPV (16)       HPV (18)       HPV (other)       HBsAg       HIV-1 HIV-2 Antibodies       Rubella       Tb             Pap Smear (Most Recent Result)     Test Value Date Time    Pap smear       Pap smear w/HPV       Pap smear w/CTNG       Pap smar w/HPV CTNG       Pap smear (reflex HPV ACUS)       Pap smear (reflex HPV ASCUS w/CTNG)       Pathology gyn specimen             Urinalysis (Most Recent Result)     Test Value Date Time    Urinalysis  Abnormal    (See Report)   08/21/19 1048    Urinalysis (culture if indicated)       POC urinalysis  (See Report)   09/09/19 1419    Urine drug screen       Urine drug screen (w/o conf)       Urine culture ([id number])       Urine culture ([id number])             1st Trimester     Test Value Date Time    Hgb       Hct       Fasting Glucose Tolerance       GTT, 1 hour       GTT, 2 hours       GTT, 3 hours             2nd Trimester     Test Value Date Time    Hgb 9.0 g/dL 08/21/19 1120    Hct 28.3 % 08/21/19 1120    Urinalysis       Urine Culture       AST       ALT       Uric Acid       Fasting Glucose Tolerance       GTT, 1 hour       GTT, 2 hours       GTT, 3 hours       Urine Protein/Creatinine Ratio             3rd Trimester     Test Value Date Time    Hgb       Hct       Platelet count       GBS (ALVAREZ BROTH)       GBS (Direct)       Urinalysis       Urine Culture       Urine Drug Screen       Urine Protein/Creatinine Ratio             Congenital Disease Screening     Test Value Date Time    First Trimester Screen       Quad Screen       Sickle Cell       Thalassemia       [facility]       Cystic Fibrosis Carrier Study                     Assessment:  Gestational Age:  39w2d  Labor State:   Labor, Active  Risk Factors:    group B strep colonizer  Pregnancy Complications: anemia    Patient Active Problem List    Diagnosis Date Noted   • Foramen ovale anyeurism fetus 2019   • Group beta Strep positive 2019   • Rubella non-immune status, antepartum 2019   • Supervision of normal first pregnancy in third trimester 2019   • Anemia in pregnancy, third trimester 2019       Plan:   Admitted for: Active Labor, GBS + - will start on PCN now, pain control, augment with Pitocin prn, anticipate .      WANDA BlasA.

## 2019-12-04 NOTE — PROGRESS NOTES
Pt resting well with epidural but has pressure. D/w pt plan to AROM and start pitocin prn.     Cervix: 6-7/90/0, amniotomy performed with good return of clear fluid.   NST: Cat 1  TOCO: UCs q 2-4 min    A/P: IUP at 39w5d - start pit prn, pt has received 1 dose of PCN for GBS+ - will get another dose at 2pm, pain control prn, anticipate .

## 2019-12-04 NOTE — PROGRESS NOTES
0950: report received from Grace GALE.   0952: pt ambulated to labor side.  0945: prolong decel, HR down to 60 x's 140 sec, fluids opened, o2 applied pt turned to left side.   0948: Chel BONDS made aware of decel,no further orders at this time  0955: monitors applied, vitals taken   1031: Dr vee at bedside for epidural   1100: solitario placed   1300: Tay BONDS at bedside, SVE: 6-7/90/0, AROM clear   1410: SVE: complete  1416: began pushing   1448: , viable baby girl, terminal mec   1454: delivery of placenta     Recovery:   Fundus firm/ light/ 2 below

## 2019-12-04 NOTE — PROGRESS NOTES
EDC - 19 EGA - 39.2    0707 - Pt arrived to labor and delivery for Ucs since 2 am and occ LOF. Pt placed in room LDA1. External monitors in place X2. Category I FHT at this time. VSS. Pt reports good FM. No complaints of vaginal bleeding.   0715 SSE no pooling noted, FERN collected. SVE 3-. FOB at bedside. POC discussed with pt and family members, all questions answered.   0810 RASHI BONDS notified of pt status, orders for SVE.  0819 SVE  RASHI BONDS notified, orders to walk pt at this time  0825 Pt up to walk the halls at this time, pt educated not to leave unit and when to return to room. Pt verbalizes understanding at this time.  0926 Pt back from walking at this time, breathing heavy through UCs, states that they are stronger since walking. SVE -, RASHI BONDS notified, orders received for admission at this time.  0935 IV started, labs drawn  0950 Admission procedures completed at this time. Report given to MALCOLM Plummer RN

## 2019-12-05 LAB — TREPONEMA PALLIDUM IGG+IGM AB [PRESENCE] IN SERUM OR PLASMA BY IMMUNOASSAY: NON REACTIVE

## 2019-12-05 PROCEDURE — 770002 HCHG ROOM/CARE - OB PRIVATE (112)

## 2019-12-05 PROCEDURE — 36415 COLL VENOUS BLD VENIPUNCTURE: CPT

## 2019-12-05 PROCEDURE — A9270 NON-COVERED ITEM OR SERVICE: HCPCS | Performed by: PHYSICIAN ASSISTANT

## 2019-12-05 PROCEDURE — 86780 TREPONEMA PALLIDUM: CPT

## 2019-12-05 PROCEDURE — 700102 HCHG RX REV CODE 250 W/ 637 OVERRIDE(OP): Performed by: PHYSICIAN ASSISTANT

## 2019-12-05 RX ADMIN — IBUPROFEN 600 MG: 600 TABLET ORAL at 06:14

## 2019-12-05 ASSESSMENT — PATIENT HEALTH QUESTIONNAIRE - PHQ9
SUM OF ALL RESPONSES TO PHQ9 QUESTIONS 1 AND 2: 0
1. LITTLE INTEREST OR PLEASURE IN DOING THINGS: NOT AT ALL
2. FEELING DOWN, DEPRESSED, IRRITABLE, OR HOPELESS: NOT AT ALL

## 2019-12-05 NOTE — PROGRESS NOTES
Patient brought from l&D. ID bands and cuddles checked and verified with labor and delivery nurse. Patient oriented to room and surroundings. Educated on emergency call light, ID bands and security issues discussed. Educated about not sleeping with infant, no carrying infant in halls, and no leaving infant unattended. Assessment completed. Discussed pain management. IV without redness and swelling. Encourage pt to call for any needs.

## 2019-12-05 NOTE — ANESTHESIA TIME REPORT
Anesthesia Start and Stop Event Times     Date Time Event    12/4/2019 1030 Ready for Procedure     1031 Anesthesia Start     1448 Anesthesia Stop        Responsible Staff  12/04/19    Name Role Begin End    Pepe Gunn M.D. Anesth 1031 1448        Preop Diagnosis (Free Text):  Pre-op Diagnosis             Preop Diagnosis (Codes):    Post op Diagnosis  Vaginal delivery      Premium Reason  Non-Premium    Comments:

## 2019-12-05 NOTE — PROGRESS NOTES
Obstetrics & Gynecology Post-Delivery Progress Note    Date of Service  2019    19 y.o.  1 s/p Vaginal, Spontaneous  Admit for active labor  Delivery date: 19  Breastfeeding: No    Events  No events    Subjective  Pain: No  Bleeding: lochia minimal  PO's: taking regular diet  Voiding: without difficulty  Ambulating: yes  Feeding: bottlefeeding    Objective  Temp:  [36.3 °C (97.4 °F)-36.9 °C (98.5 °F)] 36.3 °C (97.4 °F)  Pulse:  [] 85  Resp:  [16-18] 18  BP: ()/(54-88) 116/73  SpO2:  [96 %-100 %] 96 %    Physical Exam  General: well and resting  Chest/Breasts: nipples intact and breasts soft  Fundus: firm and below umbilicus  Incision: not applicable, (vaginal delivery)  Perineum: deferred  Extremities: symmetric and no edema    Lab Results   Component Value Date    RBC 4.11 (L) 2019       There is no immunization history on file for this patient.    Assessment/Plan  Amie Epstein is a 19 y.o.  postpartum day 1 s/p Vaginal, Spontaneous .    1. Post care: meeting all goals  2. Hemodynamics: stable  3. Pain: controlled  4. PNL: No results found for: RH, RUBELLAIGG, ZOSTIGG  5. Method of Feeding: plans to bottle feed  6. Method of Contraception: declines contraception at this time  7. Vaccinations:   There is no immunization history on file for this patient.  8. Disposition: likely home postpartum day 2    No new Assessment & Plan notes have been filed under this hospital service since the last note was generated.  Service: Obstetrics & Gynecology       VTE prophylaxis: ambulatory  Patient is GBS positive and will likely DC tomorrow    Nunu Abrams D.N.P.

## 2019-12-05 NOTE — PROGRESS NOTES
0700 Received bedside report from BALJINDER Brady. Discussed plan of care. Patient will call for pain medication as needed. All needs met at this time.

## 2019-12-05 NOTE — ANESTHESIA POSTPROCEDURE EVALUATION
Patient: Amie Epstein    Procedure Summary     Date:  12/04/19 Room / Location:      Anesthesia Start:  1031 Anesthesia Stop:  1448    Procedure:  Labor Epidural Diagnosis:      Scheduled Providers:   Responsible Provider:  Pepe Gunn M.D.    Anesthesia Type:  general ASA Status:  2          Final Anesthesia Type: general  Last vitals  BP   Blood Pressure: 122/74    Temp   36.9 °C (98.5 °F)    Pulse   Pulse: 86   Resp   18    SpO2   99 %      Anesthesia Post Evaluation      Airway patency: patent  Anesthetic complications: no  Cardiovascular status: hemodynamically stable  Respiratory status: acceptable  Hydration status: stable               Nurse Pain Score: 0 (NPRS)

## 2019-12-05 NOTE — PROGRESS NOTES
1900-- Received report from BALJINDER Mi, Infant at bedside in open crib no signs of distress.  Pt resting in bed. Discussed pain management for the day.  No further needs at the time.  Call light within reach, bed locked and in lowest position.  Rounding in place.    2000-- Assessment completed, VSS, Pt declines the need for pain intervention at this time.  Discussed plan of care for the night that pt is comfortable with.  All questions answered at this time.  Will continue to monitor.

## 2019-12-06 VITALS
DIASTOLIC BLOOD PRESSURE: 67 MMHG | BODY MASS INDEX: 25.62 KG/M2 | OXYGEN SATURATION: 96 % | HEIGHT: 71 IN | TEMPERATURE: 97.1 F | WEIGHT: 183 LBS | HEART RATE: 84 BPM | SYSTOLIC BLOOD PRESSURE: 109 MMHG | RESPIRATION RATE: 18 BRPM

## 2019-12-06 PROCEDURE — 700111 HCHG RX REV CODE 636 W/ 250 OVERRIDE (IP): Performed by: STUDENT IN AN ORGANIZED HEALTH CARE EDUCATION/TRAINING PROGRAM

## 2019-12-06 RX ORDER — PSEUDOEPHEDRINE HCL 30 MG
100 TABLET ORAL 2 TIMES DAILY PRN
Qty: 60 CAP | Refills: 0 | Status: SHIPPED | OUTPATIENT
Start: 2019-12-06

## 2019-12-06 RX ORDER — MEDROXYPROGESTERONE ACETATE 150 MG/ML
150 INJECTION, SUSPENSION INTRAMUSCULAR ONCE
Status: COMPLETED | OUTPATIENT
Start: 2019-12-06 | End: 2019-12-06

## 2019-12-06 RX ADMIN — MEDROXYPROGESTERONE ACETATE 150 MG: 150 INJECTION, SUSPENSION, EXTENDED RELEASE INTRAMUSCULAR at 06:37

## 2019-12-06 ASSESSMENT — EDINBURGH POSTNATAL DEPRESSION SCALE (EPDS)
I HAVE BEEN SO UNHAPPY THAT I HAVE HAD DIFFICULTY SLEEPING: NOT AT ALL
I HAVE BEEN ANXIOUS OR WORRIED FOR NO GOOD REASON: NO, NOT AT ALL
THE THOUGHT OF HARMING MYSELF HAS OCCURRED TO ME: NEVER
THINGS HAVE BEEN GETTING ON TOP OF ME: NO, MOST OF THE TIME I HAVE COPED QUITE WELL
I HAVE LOOKED FORWARD WITH ENJOYMENT TO THINGS: AS MUCH AS I EVER DID
I HAVE BEEN ABLE TO LAUGH AND SEE THE FUNNY SIDE OF THINGS: AS MUCH AS I ALWAYS COULD
I HAVE FELT SAD OR MISERABLE: NO, NOT AT ALL
I HAVE BEEN SO UNHAPPY THAT I HAVE BEEN CRYING: NO, NEVER
I HAVE BLAMED MYSELF UNNECESSARILY WHEN THINGS WENT WRONG: NO, NEVER
I HAVE FELT SCARED OR PANICKY FOR NO GOOD REASON: NO, NOT AT ALL

## 2019-12-06 NOTE — DISCHARGE SUMMARY
[facility]  NORMAL SPONTANEOUS VAGINAL DISCHARGE SUMMARY    PATIENT ID:  NAME:  Amie Epstein  MRN:               [id number]  YOB: 2000    DATE OF ADMISSION: 12/4/2019    DATE OF DISCHARGE: 12/6/2019     ADMITTING DIAGNOSIS:  1. Intrauterine pregnancy at 39w2d.    DISCHARGE DIAGNOSIS:  1. s/p vaginal delivery       HOSPITAL COURSE: This is a 19 y.o. year old female admitted at 39w2d who presented with + contractions, no LOF, no vaginal bleeding, + FM and in labor. Pt was 4-5 cm dilated, 90% effaced and at  -2 station on sterile vaginal exam. Pregnancy was complicated by GBS positive and was treated with penicillin prior to delivery. The patient had a good labor pattern after admission and proceeded to deliver a viable female infant weighing  7 lbs and 1.4 oz. Infants Apgars scores were 8 and 9 at one and five minutes. The patients postpartum course was uncomplicated. Hb stable at 10.7 and she was discharged home in stable condition on postpartum day #2. Depo shot requested for birth control     PROCEDURES PERFORMED: Normal spontaneous vaginal delivery under epidural anesthesia, periurethral abrasions but no lacerations requiring repair    COMPLICATIONS: None    DIET: Regular    ACTIVITY: No intercourse and nothing inserted into the vagina for 5 weeks.    MEDICATIONS:  Current Outpatient Medications   Medication Sig Dispense Refill   • docusate sodium 100 MG Cap Take 100 mg by mouth 2 times a day as needed for Constipation. 60 Cap 0       FOLLOWUP:  1) TPC in 5 weeks  2) Return to the hospital if copious vaginal bleeding or foul smelling discharge is noted

## 2019-12-06 NOTE — PROGRESS NOTES
1900-- Received report from BALJINDER Lynne, Infant at bedside in open crib no signs of distress.  Pt resting in bed. Discussed pain management for the day.  No further needs at the time.  Call light within reach, bed locked and in lowest position.  Rounding in place.    2000-- Assessment completed, VSS, Pt declines the need for pain intervention at this time.  Discussed plan of care for the night that pt is comfortable with.  All questions answered at this time.  Will continue to monitor.

## 2019-12-06 NOTE — DISCHARGE INSTRUCTIONS
POSTPARTUM DISCHARGE INSTRUCTIONS FOR MOM    YOB: 2000   Age: 19 y.o.               Admit Date: 2019     Discharge Date: 2019  Attending Doctor:  Ifeanyi Stover M.D.                  Allergies:  Patient has no known allergies.    Discharged to home by car. Discharged via wheelchair, hospital escort: Yes.  Special equipment needed: Not Applicable  Belongings with: Personal  Be sure to schedule a follow-up appointment with your primary care doctor or any specialists as instructed.     Discharge Plan:   Diet Plan: Discussed  Activity Level: Discussed  Confirmed Follow up Appointment: Patient to Call and Schedule Appointment  Confirmed Symptoms Management: Discussed  Medication Reconciliation Updated: Yes  Influenza Vaccine Indication: Patient Refuses    REASONS TO CALL YOUR OBSTETRICIAN:  1.   Persistent fever or shaking chills (Temperature higher than 100.4)  2.   Heavy bleeding (soaking more than 1 pad per hour); Passing clots  3.   Foul odor from vagina  4.   Mastitis (Breast infection; breast pain, chills, fever, redness)  5.   Urinary pain, burning or frequency  6.   Episiotomy infection  7.   Abdominal incision infection  8.   Severe depression longer than 24 hours    HAND WASHING  · Prior to handling the baby.  · Before breastfeeding or bottle feeding baby.  · After using the bathroom or changing the baby's diaper.    WOUND CARE  Ask your physician for additional care instructions.  In general:    ·  Incision:      · Keep clean and dry.    · Do NOT lift anything heavier than your baby for up to 6 weeks.    · There should not be any opening or pus.      VAGINAL CARE  · Nothing inside vagina for 6 weeks: no sexual intercourse, tampons or douching.  · Bleeding may continue for 2-4 weeks.  Amount may vary.    · Call your physician for heavy bleeding which means soaking more than 1 pad per hour    BIRTH CONTROL  · It is possible to become pregnant at any time after delivery and while  "breastfeeding.  · Plan to discuss a method of birth control with your physician at your follow up visit. visit.    DIET AND ELIMINATION  · Eating more fiber (bran cereal, fruits, and vegetables) and drinking plenty of fluids will help to avoid constipation.  · Urinary frequency after childbirth is normal.    POSTPARTUM BLUES  During the first few days after birth, you may experience a sense of the \"blues\" which may include impatience, irritability or even crying.  These feeling come and go quickly.  However, as many as 1 in 10 women experience emotional symptoms known as postpartum depression.    Postpartum depression:  May start as early as the second or third day after delivery or take several weeks or months to develop.  Symptoms of \"blues\" are present, but are more intense:  Crying spells; loss of appetite; feelings of hopelessness or loss of control; fear of touching the baby; over concern or no concern at all about the baby; little or no concern about your own appearance/caring for yourself; and/or inability to sleep or excessive sleeping.  Contact your physician if you are experiencing any of these symptoms.    Crisis Hotline:  · [place] Crisis Hotline:  2-272-ZSXJBZN  Or 1-346.588.6552  · [place] Crisis Hotline:  1-364.228.1137  Or 803-600-5189    PREVENTING SHAKEN BABY:  If you are angry or stressed, PUT THE BABY IN THE CRIB, step away, take some deep breaths, and wait until you are calm to care for the baby.  DO NOT SHAKE THE BABY.  You are not alone, call a supporter for help.    · Crisis Call Center 24/7 crisis line 352-093-2536 or 1-600.568.6061  · You can also text them, text \"ANSWER\" to 105079    QUIT SMOKING/TOBACCO USE:  I understand the use of any tobacco products increases my chance of suffering from future heart disease and could cause other illnesses which may shorten my life. Quitting the use of tobacco products is the single most important thing I can do to improve my health. For further " information on smoking / tobacco cessation call a Toll Free Quit Line at 1-159.511.8065 (*[employer] [place]) or 1-268.233.2537 (American Lung Association) or you can access the web based program at www.lungusa.org.    · [place] Tobacco Users Help Line:  (785) 163-5457       Toll Free: 1-346.809.9759  · Quit Tobacco Program [facility] [place], operated by [facility] (472)339-2767    DEPRESSION / SUICIDE RISK:  As you are discharged from this [facility], it is important to learn how to keep safe from harming yourself.    Recognize the warning signs:  · Abrupt changes in personality, positive or negative- including increase in energy   · Giving away possessions  · Change in eating patterns- significant weight changes-  positive or negative  · Change in sleeping patterns- unable to sleep or sleeping all the time   · Unwillingness or inability to communicate  · Depression  · Unusual sadness, discouragement and loneliness  · Talk of wanting to die  · Neglect of personal appearance   · Rebelliousness- reckless behavior  · Withdrawal from people/activities they love  · Confusion- inability to concentrate     If you or a loved one observes any of these behaviors or has concerns about self-harm, here's what you can do:  · Talk about it- your feelings and reasons for harming yourself  · Remove any means that you might use to hurt yourself (examples: pills, rope, extension cords, firearm)  · Get professional help from the community (Mental Health, Substance Abuse, psychological counseling)  · Do not be alone:Call your Safe Contact- someone whom you trust who will be there for you.  · Call your local CRISIS HOTLINE 000-9645 or 581-480-4382  · Call your local Children's Mobile Crisis Response Team [place] (064) 227-8781 or www.[employer]  · Call the toll free National Suicide Prevention Hotlines   · National Suicide Prevention Lifeline 554-982-BSBJ (8814)  · National Hope Line Network 800-SUICIDE  (362-7099)    DISCHARGE SURVEY:  Thank you for choosing [facility].  We hope we provided you with very good care.  You may be receiving a survey in the mail.  Please fill it out.  Your opinion is valuable to us.    ADDITIONAL EDUCATIONAL MATERIALS GIVEN TO PATIENT:        My signature on this form indicates that:  1.  I have reviewed and understand the above information  2.  My questions regarding this information have been answered to my satisfaction.  3.  I have formulated a plan with my discharge nurse to obtain my prescribed medication for home.    Follow up with the pregnancy center in 5 weeks for your post-partum check  Take colace as needed for constipation  Continue to take your prenatal vitamin   Take Tylenol and/or Motrin as needed for pain control  Nothing per vagina for 5 weeks  Return for any new or worsening complaints

## 2020-07-15 ENCOUNTER — GYNECOLOGY VISIT (OUTPATIENT)
Dept: OBGYN | Facility: CLINIC | Age: 20
End: 2020-07-15
Payer: MEDICAID

## 2020-07-15 VITALS
SYSTOLIC BLOOD PRESSURE: 110 MMHG | DIASTOLIC BLOOD PRESSURE: 70 MMHG | HEIGHT: 70 IN | WEIGHT: 180.4 LBS | BODY MASS INDEX: 25.83 KG/M2

## 2020-07-15 DIAGNOSIS — Z30.09 BIRTH CONTROL COUNSELING: ICD-10-CM

## 2020-07-15 PROCEDURE — 99212 OFFICE O/P EST SF 10 MIN: CPT | Mod: 25 | Performed by: NURSE PRACTITIONER

## 2020-07-15 PROCEDURE — 96372 THER/PROPH/DIAG INJ SC/IM: CPT | Performed by: NURSE PRACTITIONER

## 2020-07-15 RX ORDER — MEDROXYPROGESTERONE ACETATE 150 MG/ML
150 INJECTION, SUSPENSION INTRAMUSCULAR ONCE
Status: COMPLETED | OUTPATIENT
Start: 2020-07-15 | End: 2020-07-15

## 2020-07-15 RX ADMIN — MEDROXYPROGESTERONE ACETATE 150 MG: 150 INJECTION, SUSPENSION INTRAMUSCULAR at 11:31

## 2020-07-15 NOTE — PROGRESS NOTES
HPI Comments:  Amie Epstein is a 19 y.o. y.o. female who presents for problem gyn visit: . Pt has no complaints.  LMP was She has been using strict condoms for birth control method. She is her for birth control counseling. She thinks she would like the implant. She declines any STI testing as reports no new sexual partners since last October.     Review of Systems :  Constitutional: Denies any issues.   EENT: Denies any issues.   Cardio: Denies any issues.   Resp: Denies any issues  GI: Denies any issues  : Denies any issues  Pertinent positives documented in HPI and all other systems reviewed & are negative    All PMH, PSH, allergies, social history and FH reviewed and updated today:  History reviewed. No pertinent past medical history.  History reviewed. No pertinent surgical history.  Patient has no known allergies.  Social History     Socioeconomic History   • Marital status: Single     Spouse name: Not on file   • Number of children: Not on file   • Years of education: Not on file   • Highest education level: Not on file   Occupational History   • Not on file   Social Needs   • Financial resource strain: Not on file   • Food insecurity     Worry: Not on file     Inability: Not on file   • Transportation needs     Medical: Not on file     Non-medical: Not on file   Tobacco Use   • Smoking status: Never Smoker   • Smokeless tobacco: Never Used   Substance and Sexual Activity   • Alcohol use: Not Currently   • Drug use: Not Currently     Types: Inhaled     Comment: THC 1 year ago   • Sexual activity: Yes     Partners: Male     Comment: none   Lifestyle   • Physical activity     Days per week: Not on file     Minutes per session: Not on file   • Stress: Not on file   Relationships   • Social connections     Talks on phone: Not on file     Gets together: Not on file     Attends Caodaism service: Not on file     Active member of club or organization: Not on file     Attends meetings of clubs or  "organizations: Not on file     Relationship status: Not on file   • Intimate partner violence     Fear of current or ex partner: Not on file     Emotionally abused: Not on file     Physically abused: Not on file     Forced sexual activity: Not on file   Other Topics Concern   • Not on file   Social History Narrative   • Not on file     Family History   Problem Relation Age of Onset   • No Known Problems Mother    • No Known Problems Father    • Cancer Maternal Grandmother    • Cancer Paternal Grandmother    • No Known Problems Sister    • No Known Problems Brother      Medications:   Current Outpatient Medications Ordered in Epic   Medication Sig Dispense Refill   • docusate sodium 100 MG Cap Take 100 mg by mouth 2 times a day as needed for Constipation. (Patient not taking: Reported on 7/15/2020) 60 Cap 0   • IRON PO Take  by mouth.     • Prenatal Vit-Fe Fumarate-FA (PRENATAL PO) Take 1 Tab by mouth 2 Times a Day.       Current Facility-Administered Medications Ordered in Epic   Medication Dose Route Frequency Provider Last Rate Last Dose   • medroxyPROGESTERone (DEPO-PROVERA) injection 150 mg  150 mg Intramuscular Once ELIER Juarez              Objective:   Vital measurements:  /70 (BP Location: Right arm, Patient Position: Sitting, BP Cuff Size: Adult)   Ht 1.778 m (5' 10\")   Wt 81.8 kg (180 lb 6.4 oz)   Body mass index is 25.88 kg/m². (Goal BM I>18 <25)    Physical Exam   Nursing note and vitals reviewed.  Constitutional: She is oriented to person, place, and time. She appears well-developed and well-nourished. No distress.     Abdominal: Soft. Bowel sounds are normal. She exhibits no distension and no mass. No tenderness. She has no rebound and no guarding.     Neurological: She is alert and oriented to person, place, and time. She exhibits normal muscle tone.     Skin: Skin is warm and dry. No rash noted. She is not diaphoretic. No erythema. No pallor.     Psychiatric: She has a normal " mood and affect. Her behavior is normal. Judgment and thought content normal.        Assessment:     1. Birth control counseling  medroxyPROGESTERone (DEPO-PROVERA) injection 150 mg         Plan:   Depo today  Desires implant: HPN so need pre Auth  Reviewed quick start depo, three month efficacy   Reviewed she will need pap possibly at implant placement due to turning 20 soon   F/u for implant insertion     No follow-ups on file.

## 2020-07-15 NOTE — NON-PROVIDER
Patient here for a GYN follow up.   Last seen on 2019  pharmacy verified.  Patient phone #: [phone]  Depo give today     NDC: 9836-5613-85  LOT#: [id number]  Expiration Date:   Dose: 150mg  Site: Left Deltoid  Patient educated on use and side effects of medication. Name and  verified prior to injection. Pt tolerated? Well   Verified by ROWAN  Administered by Brendon Wilder Ass't at 11:00 AM.  Patient Provided Medication: no

## 2020-10-02 ENCOUNTER — NON-PROVIDER VISIT (OUTPATIENT)
Dept: OBGYN | Facility: CLINIC | Age: 20
End: 2020-10-02
Payer: MEDICAID

## 2020-10-02 VITALS
DIASTOLIC BLOOD PRESSURE: 70 MMHG | BODY MASS INDEX: 29.65 KG/M2 | SYSTOLIC BLOOD PRESSURE: 120 MMHG | HEIGHT: 71 IN | WEIGHT: 211.8 LBS

## 2020-10-02 PROCEDURE — 96372 THER/PROPH/DIAG INJ SC/IM: CPT | Performed by: ADVANCED PRACTICE MIDWIFE

## 2020-10-02 RX ORDER — MEDROXYPROGESTERONE ACETATE 150 MG/ML
150 INJECTION, SUSPENSION INTRAMUSCULAR ONCE
Status: COMPLETED | OUTPATIENT
Start: 2020-10-02 | End: 2020-10-02

## 2020-10-02 RX ADMIN — MEDROXYPROGESTERONE ACETATE 150 MG: 150 INJECTION, SUSPENSION INTRAMUSCULAR at 15:37

## 2020-10-02 NOTE — NON-PROVIDER
Patient here for a Depo   Last seen on 07/15/2020  pharmacy verified.  Patient phone #:[phone]    NDC: 7960-5057-10  LOT#: [id number]   Expiration Date: 2022  Dose: 150  Site: Right Deltoid  Patient educated on use and side effects of medication. Name and  verified prior to injection. Pt tolerated? Well   Verified by ROWAN  Administered by Brendon Wilder Ass't at 3:39 PM.  Patient Provided Medication: no  Next dose

## 2021-01-11 ENCOUNTER — NON-PROVIDER VISIT (OUTPATIENT)
Dept: OCCUPATIONAL MEDICINE | Facility: CLINIC | Age: 21
End: 2021-01-11

## 2021-01-11 DIAGNOSIS — Z11.1 ENCOUNTER FOR PPD TEST: Primary | ICD-10-CM

## 2021-01-11 PROCEDURE — 86580 TB INTRADERMAL TEST: CPT | Performed by: NURSE PRACTITIONER

## 2021-01-13 ENCOUNTER — NON-PROVIDER VISIT (OUTPATIENT)
Dept: OCCUPATIONAL MEDICINE | Facility: CLINIC | Age: 21
End: 2021-01-13

## 2021-01-13 DIAGNOSIS — Z11.1 ENCOUNTER FOR PPD SKIN TEST READING: ICD-10-CM

## 2021-01-13 LAB — TB WHEAL 3D P 5 TU DIAM: NORMAL MM

## 2021-01-18 ENCOUNTER — NON-PROVIDER VISIT (OUTPATIENT)
Dept: OCCUPATIONAL MEDICINE | Facility: CLINIC | Age: 21
End: 2021-01-18

## 2021-01-18 DIAGNOSIS — Z11.1 ENCOUNTER FOR PPD TEST: Primary | ICD-10-CM

## 2021-01-18 PROCEDURE — 86580 TB INTRADERMAL TEST: CPT | Performed by: NURSE PRACTITIONER

## 2021-01-20 ENCOUNTER — NON-PROVIDER VISIT (OUTPATIENT)
Dept: OCCUPATIONAL MEDICINE | Facility: CLINIC | Age: 21
End: 2021-01-20

## 2021-01-20 LAB — TB WHEAL 3D P 5 TU DIAM: NORMAL MM

## 2021-08-28 ENCOUNTER — HOSPITAL ENCOUNTER (EMERGENCY)
Facility: MEDICAL CENTER | Age: 21
End: 2021-08-28
Attending: EMERGENCY MEDICINE
Payer: COMMERCIAL

## 2021-08-28 VITALS
TEMPERATURE: 98 F | DIASTOLIC BLOOD PRESSURE: 67 MMHG | RESPIRATION RATE: 17 BRPM | HEART RATE: 80 BPM | SYSTOLIC BLOOD PRESSURE: 115 MMHG | BODY MASS INDEX: 29.15 KG/M2 | WEIGHT: 209 LBS | OXYGEN SATURATION: 98 %

## 2021-08-28 DIAGNOSIS — B34.9 VIRAL ILLNESS: ICD-10-CM

## 2021-08-28 LAB
EKG IMPRESSION: NORMAL
HCG UR QL: NEGATIVE

## 2021-08-28 PROCEDURE — A9270 NON-COVERED ITEM OR SERVICE: HCPCS | Performed by: EMERGENCY MEDICINE

## 2021-08-28 PROCEDURE — 99284 EMERGENCY DEPT VISIT MOD MDM: CPT

## 2021-08-28 PROCEDURE — 93005 ELECTROCARDIOGRAM TRACING: CPT

## 2021-08-28 PROCEDURE — 93005 ELECTROCARDIOGRAM TRACING: CPT | Performed by: EMERGENCY MEDICINE

## 2021-08-28 PROCEDURE — 81025 URINE PREGNANCY TEST: CPT

## 2021-08-28 PROCEDURE — 700102 HCHG RX REV CODE 250 W/ 637 OVERRIDE(OP): Performed by: EMERGENCY MEDICINE

## 2021-08-28 RX ORDER — OMEPRAZOLE 40 MG/1
40 CAPSULE, DELAYED RELEASE ORAL DAILY
Qty: 30 CAPSULE | Refills: 0 | Status: SHIPPED | OUTPATIENT
Start: 2021-08-28

## 2021-08-28 RX ORDER — ONDANSETRON 4 MG/1
4 TABLET, ORALLY DISINTEGRATING ORAL EVERY 6 HOURS PRN
Qty: 10 TABLET | Refills: 0 | Status: SHIPPED | OUTPATIENT
Start: 2021-08-28

## 2021-08-28 RX ADMIN — LIDOCAINE HYDROCHLORIDE 30 ML: 20 SOLUTION OROPHARYNGEAL at 17:38

## 2021-08-28 NOTE — ED TRIAGE NOTES
".Amie Epstein  .  Chief Complaint   Patient presents with   • Chest Pain     c/o mid sternal chest pain x 1 month, worse with inspiration.   • Nausea     x 1 day   • Diarrhea     x 1 day     Patient to triage with above complaints. Patient denies any fevers or SOB. Patient reports \"My daughter was recently sick with the stomach bug\".  EKG completed.   Patient to lobby and instructed to inform staff of any needs.  "

## 2021-08-29 NOTE — ED PROVIDER NOTES
ED Provider Note    CHIEF COMPLAINT  Chief Complaint   Patient presents with   • Chest Pain     c/o mid sternal chest pain x 1 month, worse with inspiration.   • Nausea     x 1 day   • Diarrhea     x 1 day       HPI  Amie Hiren Epstein is a 21 y.o. female who presents with nausea, cramping abdominal pain, and diarrhea.  The patient states she has been sick over the last 48 hours.  She has not had any associated fevers.  Has difficulty with breathing.  She states she is unvaccinated.  She states she is also had chest pain that is substernal in location.  She describes it as burning.  She is had this for about a month.  She states is worse when she lays down flat and better when she is on her right side or sits up.  She states she has been under some stress recently.  She does not have any cardiac risk factors and she denies alcohol and drug abuse.  She does not have any pain or swelling to the lower extremities nor did she have any risk factors from a DVT standpoint.    REVIEW OF SYSTEMS  See HPI for further details. All other systems are negative.     PAST MEDICAL HISTORY  No past medical history on file.    FAMILY HISTORY  [unfilled]    SOCIAL HISTORY  Social History     Socioeconomic History   • Marital status: Single     Spouse name: Not on file   • Number of children: Not on file   • Years of education: Not on file   • Highest education level: Not on file   Occupational History   • Not on file   Tobacco Use   • Smoking status: Former Smoker     Packs/day: 0.00   • Smokeless tobacco: Never Used   Vaping Use   • Vaping Use: Never used   Substance and Sexual Activity   • Alcohol use: Not Currently   • Drug use: Not Currently     Types: Inhaled     Comment: THC 1 year ago   • Sexual activity: Yes     Partners: Male     Comment: none   Other Topics Concern   • Not on file   Social History Narrative   • Not on file     Social Determinants of Health     Financial Resource Strain:    • Difficulty of Paying  Living Expenses:    Food Insecurity:    • Worried About Running Out of Food in the Last Year:    • Ran Out of Food in the Last Year:    Transportation Needs:    • Lack of Transportation (Medical):    • Lack of Transportation (Non-Medical):    Physical Activity:    • Days of Exercise per Week:    • Minutes of Exercise per Session:    Stress:    • Feeling of Stress :    Social Connections:    • Frequency of Communication with Friends and Family:    • Frequency of Social Gatherings with Friends and Family:    • Attends Congregational Services:    • Active Member of Clubs or Organizations:    • Attends Club or Organization Meetings:    • Marital Status:    Intimate Partner Violence:    • Fear of Current or Ex-Partner:    • Emotionally Abused:    • Physically Abused:    • Sexually Abused:        SURGICAL HISTORY  No past surgical history on file.    CURRENT MEDICATIONS  Home Medications     Reviewed by Jena Barriga R.N. (Registered Nurse) on 08/28/21 at 1548  Med List Status: Complete   Medication Last Dose Status   docusate sodium 100 MG Cap  Active   IRON PO not taking Active   Prenatal Vit-Fe Fumarate-FA (PRENATAL PO) not taking Active                ALLERGIES  No Known Allergies    PHYSICAL EXAM  VITAL SIGNS: /72   Pulse 96   Temp 36.8 °C (98.3 °F) (Temporal)   Resp 16   Wt 94.8 kg (209 lb)   LMP 08/19/2021   SpO2 99%   BMI 29.15 kg/m²       Constitutional: Well developed, Well nourished, No acute distress, Non-toxic appearance.   HENT: Normocephalic, Atraumatic, Bilateral external ears normal, Oropharynx moist, No oral exudates, Nose normal.   Eyes: PERRLA, EOMI, Conjunctiva normal, No discharge.   Neck: Normal range of motion, No tenderness, Supple, No stridor.   Lymphatic: No lymphadenopathy noted.   Cardiovascular: Normal heart rate, Normal rhythm, No murmurs, No rubs, No gallops.   Thorax & Lungs: Normal breath sounds, No respiratory distress, No wheezing, No chest tenderness.   Abdomen: Bowel  sounds normal, Soft, No tenderness, No masses, No pulsatile masses.   Skin: Warm, Dry, No erythema, No rash.   Back: No tenderness, No CVA tenderness.   Extremities: Intact distal pulses, No edema, No tenderness, No cyanosis, No clubbing.    Neurologic: Alert & oriented x 3, Normal motor function, Normal sensory function, No focal deficits noted.   Psychiatric: Affect normal, Judgment normal, Mood normal.     Results for orders placed or performed during the hospital encounter of 20   BETA-HCG QUALITATIVE URINE   Result Value Ref Range    Beta-Hcg Urine Negative Negative   EKG   Result Value Ref Range    Report       [facility]    Test Date:  2021  Pt Name:    MAY MENON              Department: ER  MRN:        [id number]                      Room:  Gender:     Female                       Technician: [id number]  :        2000                   Requested By:ER TRIAGE PROTOCOL  Order #:    [id number]                    Reading MD: BENITEZ HAYNES MD    Measurements  Intervals                                Axis  Rate:       90                           P:          53  DC:         172                          QRS:        28  QRSD:       86                           T:          43  QT:         352  QTc:        431    Interpretive Statements  Twelve-lead EKG shows a normal sinus rhythm with a ventricular to 90, normal  QRS, normal intervals, no ST segment elevation or depression, normal T waves.  Overall no dynamic changes from prior  Electronically Signed On 2021 17:27:06 PDT by BENITEZ HAYNES MD         COURSE & MEDICAL DECISION MAKING  Pertinent Labs & Imaging studies reviewed. (See chart for details)  This a 21-year-old female who presents the emerge department with signs and symptoms consistent with a viral process.  The patient is aware COVID-19 would be in the differential and she does not want to be tested at this time.  The patient does not appear  toxic nor hypoxic.  She did receive a GI cocktail and had significant relief of her chest discomfort.  Based on her age and lack of risk factors a cardiac source would be unlikely and her heart score is 0.  EKG was performed via protocol in triage and does not show any ischemic changes.  She has had the chest pain for quite some time and I suspect this is from gastritis and esophagitis.  I will discharge the patient home on Zofran for her nausea and vomiting from the viral illness as well as on Prilosec for the suspected gastritis and esophagitis.  We will keep the patient out of work for the next 48 hours.  If the patient's not better in 72 hours she will return for repeat examination.    FINAL IMPRESSION  1.  Chest pain  2.  Suspect esophagitis  3.  Viral illness    Disposition  The patient will be discharged in stable condition         Electronically signed by: Jean Freire M.D., 8/28/2021 5:24 PM